# Patient Record
Sex: FEMALE | Race: WHITE | NOT HISPANIC OR LATINO
[De-identification: names, ages, dates, MRNs, and addresses within clinical notes are randomized per-mention and may not be internally consistent; named-entity substitution may affect disease eponyms.]

---

## 2017-09-20 PROBLEM — Z00.00 ENCOUNTER FOR PREVENTIVE HEALTH EXAMINATION: Status: ACTIVE | Noted: 2017-09-20

## 2017-09-22 ENCOUNTER — MESSAGE (OUTPATIENT)
Age: 82
End: 2017-09-22

## 2017-10-01 RX ORDER — ASPIRIN ENTERIC COATED TABLETS 81 MG 81 MG/1
81 TABLET, DELAYED RELEASE ORAL DAILY
Qty: 30 | Refills: 3 | Status: ACTIVE | COMMUNITY
Start: 2017-10-01

## 2017-10-02 ENCOUNTER — APPOINTMENT (OUTPATIENT)
Dept: FAMILY MEDICINE | Facility: CLINIC | Age: 82
End: 2017-10-02
Payer: MEDICARE

## 2017-10-02 VITALS
SYSTOLIC BLOOD PRESSURE: 140 MMHG | HEIGHT: 62 IN | HEART RATE: 70 BPM | BODY MASS INDEX: 25.76 KG/M2 | RESPIRATION RATE: 16 BRPM | WEIGHT: 140 LBS | OXYGEN SATURATION: 98 % | DIASTOLIC BLOOD PRESSURE: 60 MMHG | TEMPERATURE: 97.6 F

## 2017-10-02 DIAGNOSIS — Z23 ENCOUNTER FOR IMMUNIZATION: ICD-10-CM

## 2017-10-02 DIAGNOSIS — I10 ESSENTIAL (PRIMARY) HYPERTENSION: ICD-10-CM

## 2017-10-02 DIAGNOSIS — Z86.39 PERSONAL HISTORY OF OTHER ENDOCRINE, NUTRITIONAL AND METABOLIC DISEASE: ICD-10-CM

## 2017-10-02 DIAGNOSIS — Z63.4 DISAPPEARANCE AND DEATH OF FAMILY MEMBER: ICD-10-CM

## 2017-10-02 DIAGNOSIS — C44.92 SQUAMOUS CELL CARCINOMA OF SKIN, UNSPECIFIED: ICD-10-CM

## 2017-10-02 DIAGNOSIS — Z80.3 FAMILY HISTORY OF MALIGNANT NEOPLASM OF BREAST: ICD-10-CM

## 2017-10-02 DIAGNOSIS — Z87.2 PERSONAL HISTORY OF DISEASES OF THE SKIN AND SUBCUTANEOUS TISSUE: ICD-10-CM

## 2017-10-02 DIAGNOSIS — F15.90 OTHER STIMULANT USE, UNSPECIFIED, UNCOMPLICATED: ICD-10-CM

## 2017-10-02 DIAGNOSIS — M19.049 PRIMARY OSTEOARTHRITIS, UNSPECIFIED HAND: ICD-10-CM

## 2017-10-02 PROCEDURE — 90686 IIV4 VACC NO PRSV 0.5 ML IM: CPT

## 2017-10-02 PROCEDURE — 99204 OFFICE O/P NEW MOD 45 MIN: CPT | Mod: 25

## 2017-10-02 PROCEDURE — G0008: CPT

## 2017-10-02 RX ORDER — NYSTATIN 100000 1/G
100000 POWDER TOPICAL
Qty: 1 | Refills: 1 | Status: COMPLETED | COMMUNITY
Start: 2017-10-01 | End: 2017-10-02

## 2017-10-02 RX ORDER — ACETAMINOPHEN 325 MG/1
325 TABLET, FILM COATED ORAL DAILY
Qty: 30 | Refills: 0 | Status: ACTIVE | COMMUNITY
Start: 2017-10-02

## 2017-10-02 RX ORDER — ASCORBIC ACID 1000 MG
1000 TABLET ORAL DAILY
Qty: 30 | Refills: 0 | Status: ACTIVE | COMMUNITY
Start: 2017-10-02

## 2017-10-02 RX ORDER — DOCUSATE SODIUM 100 MG/1
100 CAPSULE ORAL
Qty: 20 | Refills: 0 | Status: ACTIVE | COMMUNITY
Start: 2017-10-02

## 2017-10-02 RX ORDER — WHEAT DEXTRIN 3 G/4 G
POWDER (GRAM) ORAL DAILY
Qty: 30 | Refills: 3 | Status: ACTIVE | COMMUNITY
Start: 2017-10-02

## 2017-10-02 SDOH — SOCIAL STABILITY - SOCIAL INSECURITY: DISSAPEARANCE AND DEATH OF FAMILY MEMBER: Z63.4

## 2018-01-05 ENCOUNTER — APPOINTMENT (OUTPATIENT)
Dept: FAMILY MEDICINE | Facility: CLINIC | Age: 83
End: 2018-01-05
Payer: MEDICARE

## 2018-01-05 VITALS
OXYGEN SATURATION: 98 % | TEMPERATURE: 97.6 F | RESPIRATION RATE: 16 BRPM | HEART RATE: 65 BPM | BODY MASS INDEX: 24.88 KG/M2 | SYSTOLIC BLOOD PRESSURE: 150 MMHG | WEIGHT: 136 LBS | DIASTOLIC BLOOD PRESSURE: 70 MMHG

## 2018-01-05 DIAGNOSIS — L98.9 DISORDER OF THE SKIN AND SUBCUTANEOUS TISSUE, UNSPECIFIED: ICD-10-CM

## 2018-01-05 DIAGNOSIS — G30.8 OTHER ALZHEIMER'S DISEASE: ICD-10-CM

## 2018-01-05 DIAGNOSIS — F02.80 OTHER ALZHEIMER'S DISEASE: ICD-10-CM

## 2018-01-05 PROCEDURE — 99214 OFFICE O/P EST MOD 30 MIN: CPT

## 2018-01-05 RX ORDER — QUETIAPINE FUMARATE 25 MG/1
25 TABLET ORAL
Qty: 30 | Refills: 0 | Status: ACTIVE | COMMUNITY
Start: 2018-01-05 | End: 1900-01-01

## 2018-01-06 PROBLEM — L98.9 SKIN LESION OF LEFT LEG: Status: ACTIVE | Noted: 2018-01-05

## 2018-01-29 ENCOUNTER — APPOINTMENT (OUTPATIENT)
Dept: FAMILY MEDICINE | Facility: CLINIC | Age: 83
End: 2018-01-29
Payer: MEDICARE

## 2018-01-29 VITALS
HEART RATE: 69 BPM | OXYGEN SATURATION: 93 % | DIASTOLIC BLOOD PRESSURE: 60 MMHG | SYSTOLIC BLOOD PRESSURE: 128 MMHG | RESPIRATION RATE: 15 BRPM | TEMPERATURE: 98 F

## 2018-01-29 PROCEDURE — 99214 OFFICE O/P EST MOD 30 MIN: CPT

## 2018-01-29 RX ORDER — SOFT LENS DISINFECTANT
SOLUTION, NON-ORAL MISCELLANEOUS
Qty: 1 | Refills: 0 | Status: ACTIVE | COMMUNITY
Start: 2018-01-29 | End: 1900-01-01

## 2018-01-29 RX ORDER — GUAIFENESIN 600 MG/1
600 TABLET, EXTENDED RELEASE ORAL
Qty: 20 | Refills: 0 | Status: COMPLETED | COMMUNITY
Start: 2018-01-29 | End: 2018-02-08

## 2018-04-06 ENCOUNTER — APPOINTMENT (OUTPATIENT)
Dept: FAMILY MEDICINE | Facility: CLINIC | Age: 83
End: 2018-04-06
Payer: MEDICARE

## 2018-04-06 VITALS
DIASTOLIC BLOOD PRESSURE: 70 MMHG | RESPIRATION RATE: 15 BRPM | TEMPERATURE: 97.8 F | HEIGHT: 62 IN | BODY MASS INDEX: 23.92 KG/M2 | HEART RATE: 73 BPM | SYSTOLIC BLOOD PRESSURE: 130 MMHG | WEIGHT: 130 LBS | OXYGEN SATURATION: 98 %

## 2018-04-06 DIAGNOSIS — E53.8 DEFICIENCY OF OTHER SPECIFIED B GROUP VITAMINS: ICD-10-CM

## 2018-04-06 DIAGNOSIS — J45.909 UNSPECIFIED ASTHMA, UNCOMPLICATED: ICD-10-CM

## 2018-04-06 DIAGNOSIS — M81.0 AGE-RELATED OSTEOPOROSIS W/OUT CURRENT PATHOLOGICAL FRACTURE: ICD-10-CM

## 2018-04-06 DIAGNOSIS — E55.9 VITAMIN D DEFICIENCY, UNSPECIFIED: ICD-10-CM

## 2018-04-06 PROCEDURE — 99213 OFFICE O/P EST LOW 20 MIN: CPT

## 2018-04-06 RX ORDER — ALBUTEROL SULFATE 2.5 MG/3ML
(2.5 MG/3ML) SOLUTION RESPIRATORY (INHALATION) EVERY 6 HOURS
Qty: 12 | Refills: 1 | Status: DISCONTINUED | COMMUNITY
Start: 2018-01-29 | End: 2018-04-06

## 2018-04-06 RX ORDER — NEBULIZER ACCESSORIES
KIT MISCELLANEOUS
Qty: 1 | Refills: 0 | Status: COMPLETED | COMMUNITY
Start: 2018-01-29 | End: 2018-04-06

## 2018-04-07 PROBLEM — E55.9 VITAMIN D DEFICIENCY: Status: ACTIVE | Noted: 2017-10-02

## 2018-04-07 PROBLEM — E53.8 VITAMIN B12 DEFICIENCY: Status: ACTIVE | Noted: 2017-10-01

## 2018-05-03 ENCOUNTER — MEDICATION RENEWAL (OUTPATIENT)
Age: 83
End: 2018-05-03

## 2018-05-03 DIAGNOSIS — B36.9 SUPERFICIAL MYCOSIS, UNSPECIFIED: ICD-10-CM

## 2018-05-03 RX ORDER — NYSTATIN 100000 [USP'U]/G
100000 CREAM TOPICAL
Qty: 1 | Refills: 1 | Status: ACTIVE | COMMUNITY
Start: 2018-05-03 | End: 1900-01-01

## 2018-05-17 ENCOUNTER — MESSAGE (OUTPATIENT)
Age: 83
End: 2018-05-17

## 2018-07-02 ENCOUNTER — MESSAGE (OUTPATIENT)
Age: 83
End: 2018-07-02

## 2018-07-02 PROBLEM — G30.9 DEMENTIA, ALZHEIMER'S, WITH BEHAVIOR DISTURBANCE: Status: ACTIVE | Noted: 2018-01-05

## 2018-07-02 PROBLEM — Z23 NEED FOR PNEUMOCOCCAL VACCINE: Status: ACTIVE | Noted: 2018-01-04

## 2018-07-02 PROBLEM — S81.812A NONINFECTED SKIN TEAR OF LEFT LEG: Status: RESOLVED | Noted: 2018-01-29 | Resolved: 2018-07-02

## 2018-07-02 PROBLEM — S41.111A SKIN TEAR OF RIGHT UPPER EXTREMITY: Status: RESOLVED | Noted: 2018-01-29 | Resolved: 2018-07-02

## 2018-07-02 PROBLEM — K59.00 CONSTIPATION, UNSPECIFIED CONSTIPATION TYPE: Status: ACTIVE | Noted: 2017-10-02

## 2018-07-03 ENCOUNTER — APPOINTMENT (OUTPATIENT)
Dept: FAMILY MEDICINE | Facility: CLINIC | Age: 83
End: 2018-07-03
Payer: MEDICARE

## 2018-07-03 VITALS
WEIGHT: 128 LBS | SYSTOLIC BLOOD PRESSURE: 140 MMHG | TEMPERATURE: 98 F | DIASTOLIC BLOOD PRESSURE: 60 MMHG | OXYGEN SATURATION: 99 % | RESPIRATION RATE: 15 BRPM | BODY MASS INDEX: 23.55 KG/M2 | HEIGHT: 62 IN | HEART RATE: 65 BPM

## 2018-07-03 DIAGNOSIS — G30.9 ALZHEIMER'S DISEASE, UNSPECIFIED: ICD-10-CM

## 2018-07-03 DIAGNOSIS — K59.00 CONSTIPATION, UNSPECIFIED: ICD-10-CM

## 2018-07-03 DIAGNOSIS — Z23 ENCOUNTER FOR IMMUNIZATION: ICD-10-CM

## 2018-07-03 DIAGNOSIS — S41.111A LACERATION W/OUT FOREIGN BODY OF RIGHT UPPER ARM, INITIAL ENCOUNTER: ICD-10-CM

## 2018-07-03 DIAGNOSIS — S81.812A LACERATION W/OUT FOREIGN BODY, LEFT LOWER LEG, INITIAL ENCOUNTER: ICD-10-CM

## 2018-07-03 DIAGNOSIS — F02.81 ALZHEIMER'S DISEASE, UNSPECIFIED: ICD-10-CM

## 2018-07-03 PROCEDURE — 99213 OFFICE O/P EST LOW 20 MIN: CPT

## 2018-07-03 NOTE — COUNSELING
[Healthy eating counseling provided] : healthy eating [Activity counseling provided] : activity [Disease Management counseling provided] : disease management  [Behavioral health counseling provided] : behavioral health  [Walking] : Walking [Low Salt Diet] : Low salt diet [Take medications as directed] : Take medications as directed [Avoid Social Isolation] : Avoid social isolation [Sleep ___ hours/day] : Sleep [unfilled] hours/day [Engage in a relaxing activity] : Engage in a relaxing activity

## 2018-07-04 NOTE — HISTORY OF PRESENT ILLNESS
[Does not check BP] : The patient is not checking blood pressure [Family Member] : family member [FreeTextEntry1] : dementia status is unchanged, no further episodes of agitation and family is aware to keep things same from day to day with minimal change in routine to assist in controlling any agitation.

## 2018-07-04 NOTE — HEALTH RISK ASSESSMENT
[(PHQ-2) Unable to screen] : PHQ-2: unable to screen [] : No [No falls in past year] : Patient reported no falls in the past year [de-identified] : none [UnableToScreenReason] : dementia [Some assistance needed] : feeding [Full assistance needed] : managing finances

## 2018-07-04 NOTE — REVIEW OF SYSTEMS
[Constipation] : constipation [Negative] : Musculoskeletal [Fever] : no fever [Earache] : no earache [Nasal Discharge] : no nasal discharge [Sore Throat] : no sore throat [Cough] : no cough [Dyspnea on Exertion] : no dyspnea on exertion [Abdominal Pain] : no abdominal pain [FreeTextEntry7] : constipation is stable with current regimen and diet, appetite is good according to daughter-in-law [de-identified] : dry skin

## 2018-07-04 NOTE — PHYSICAL EXAM
[No Acute Distress] : no acute distress [Well Nourished] : well nourished [Normal Sclera/Conjunctiva] : normal sclera/conjunctiva [PERRL] : pupils equal round and reactive to light [Normal Outer Ear/Nose] : the outer ears and nose were normal in appearance [No JVD] : no jugular venous distention [No Respiratory Distress] : no respiratory distress  [Clear to Auscultation] : lungs were clear to auscultation bilaterally [No Accessory Muscle Use] : no accessory muscle use [Normal Rate] : normal rate  [Regular Rhythm] : with a regular rhythm [Normal S1, S2] : normal S1 and S2 [No Murmur] : no murmur heard [No Edema] : there was no peripheral edema [Normal Supraclavicular Nodes] : no supraclavicular lymphadenopathy [Normal Posterior Cervical Nodes] : no posterior cervical lymphadenopathy [Normal Anterior Cervical Nodes] : no anterior cervical lymphadenopathy [No Focal Deficits] : no focal deficits [Normal Affect] : the affect was normal [de-identified] : several keratoses, no skin tears at this time, n wounds

## 2018-07-22 ENCOUNTER — RESULT REVIEW (OUTPATIENT)
Age: 83
End: 2018-07-22

## 2018-07-22 LAB
25(OH)D3 SERPL-MCNC: 54 NG/ML
ALBUMIN SERPL ELPH-MCNC: 3.8 G/DL
ANION GAP SERPL CALC-SCNC: 13 MMOL/L
BASOPHILS # BLD AUTO: 0.02 K/UL
BASOPHILS NFR BLD AUTO: 0.3 %
BUN SERPL-MCNC: 13 MG/DL
CALCIUM SERPL-MCNC: 9.7 MG/DL
CHLORIDE SERPL-SCNC: 101 MMOL/L
CO2 SERPL-SCNC: 26 MMOL/L
CREAT SERPL-MCNC: 1.11 MG/DL
EOSINOPHIL # BLD AUTO: 0.19 K/UL
EOSINOPHIL NFR BLD AUTO: 3 %
GLUCOSE SERPL-MCNC: 96 MG/DL
HCT VFR BLD CALC: 41.6 %
HGB BLD-MCNC: 14.2 G/DL
IMM GRANULOCYTES NFR BLD AUTO: 0.3 %
LYMPHOCYTES # BLD AUTO: 1.56 K/UL
LYMPHOCYTES NFR BLD AUTO: 24.8 %
MAN DIFF?: NORMAL
MCHC RBC-ENTMCNC: 33.2 PG
MCHC RBC-ENTMCNC: 34.1 GM/DL
MCV RBC AUTO: 97.2 FL
MONOCYTES # BLD AUTO: 0.53 K/UL
MONOCYTES NFR BLD AUTO: 8.4 %
NEUTROPHILS # BLD AUTO: 3.98 K/UL
NEUTROPHILS NFR BLD AUTO: 63.2 %
PHOSPHATE SERPL-MCNC: 3.6 MG/DL
PLATELET # BLD AUTO: 234 K/UL
POTASSIUM SERPL-SCNC: 4.1 MMOL/L
RBC # BLD: 4.28 M/UL
RBC # FLD: 13.1 %
SODIUM SERPL-SCNC: 140 MMOL/L
VIT B12 SERPL-MCNC: 1816 PG/ML
WBC # FLD AUTO: 6.3 K/UL

## 2018-09-22 ENCOUNTER — INPATIENT (INPATIENT)
Facility: HOSPITAL | Age: 83
LOS: 3 days | Discharge: OTHER INSTITUTION | DRG: 481 | End: 2018-09-26
Attending: FAMILY MEDICINE | Admitting: HOSPITALIST
Payer: MEDICARE

## 2018-09-22 ENCOUNTER — TRANSCRIPTION ENCOUNTER (OUTPATIENT)
Age: 83
End: 2018-09-22

## 2018-09-22 VITALS
SYSTOLIC BLOOD PRESSURE: 197 MMHG | RESPIRATION RATE: 18 BRPM | OXYGEN SATURATION: 98 % | DIASTOLIC BLOOD PRESSURE: 122 MMHG | HEART RATE: 61 BPM | WEIGHT: 134.92 LBS | HEIGHT: 62 IN

## 2018-09-22 DIAGNOSIS — Z98.891 HISTORY OF UTERINE SCAR FROM PREVIOUS SURGERY: Chronic | ICD-10-CM

## 2018-09-22 DIAGNOSIS — N18.3 CHRONIC KIDNEY DISEASE, STAGE 3 (MODERATE): ICD-10-CM

## 2018-09-22 DIAGNOSIS — S72.92XA UNSPECIFIED FRACTURE OF LEFT FEMUR, INITIAL ENCOUNTER FOR CLOSED FRACTURE: ICD-10-CM

## 2018-09-22 DIAGNOSIS — F03.90 UNSPECIFIED DEMENTIA WITHOUT BEHAVIORAL DISTURBANCE: ICD-10-CM

## 2018-09-22 DIAGNOSIS — E89.0 POSTPROCEDURAL HYPOTHYROIDISM: Chronic | ICD-10-CM
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
## 2018-09-22 DIAGNOSIS — S72.142A DISPLACED INTERTROCHANTERIC FRACTURE OF LEFT FEMUR, INITIAL ENCOUNTER FOR CLOSED FRACTURE: ICD-10-CM

## 2018-09-22 LAB
ALBUMIN SERPL ELPH-MCNC: 3.3 G/DL — SIGNIFICANT CHANGE UP (ref 3.3–5)
ALP SERPL-CCNC: 60 U/L — SIGNIFICANT CHANGE UP (ref 40–120)
ALT FLD-CCNC: 16 U/L DA — SIGNIFICANT CHANGE UP (ref 10–45)
ANION GAP SERPL CALC-SCNC: 11 MMOL/L — SIGNIFICANT CHANGE UP (ref 5–17)
APTT BLD: 29.6 SEC — SIGNIFICANT CHANGE UP (ref 27.5–37.4)
AST SERPL-CCNC: 25 U/L — SIGNIFICANT CHANGE UP (ref 10–40)
BILIRUB SERPL-MCNC: 0.4 MG/DL — SIGNIFICANT CHANGE UP (ref 0.2–1.2)
BLD GP AB SCN SERPL QL: SIGNIFICANT CHANGE UP
BUN SERPL-MCNC: 17 MG/DL — SIGNIFICANT CHANGE UP (ref 7–23)
CALCIUM SERPL-MCNC: 9.3 MG/DL — SIGNIFICANT CHANGE UP (ref 8.4–10.5)
CHLORIDE SERPL-SCNC: 104 MMOL/L — SIGNIFICANT CHANGE UP (ref 96–108)
CO2 SERPL-SCNC: 24 MMOL/L — SIGNIFICANT CHANGE UP (ref 22–31)
CREAT SERPL-MCNC: 1.04 MG/DL — SIGNIFICANT CHANGE UP (ref 0.5–1.3)
GLUCOSE SERPL-MCNC: 118 MG/DL — HIGH (ref 70–99)
HCT VFR BLD CALC: 39.4 % — SIGNIFICANT CHANGE UP (ref 34.5–45)
HGB BLD-MCNC: 13.2 G/DL — SIGNIFICANT CHANGE UP (ref 11.5–15.5)
INR BLD: 1.08 RATIO — SIGNIFICANT CHANGE UP (ref 0.88–1.16)
MCHC RBC-ENTMCNC: 33.5 GM/DL — SIGNIFICANT CHANGE UP (ref 32–36)
MCHC RBC-ENTMCNC: 33.5 PG — SIGNIFICANT CHANGE UP (ref 27–34)
MCV RBC AUTO: 100.2 FL — HIGH (ref 80–100)
PLATELET # BLD AUTO: 203 K/UL — SIGNIFICANT CHANGE UP (ref 150–400)
POTASSIUM SERPL-MCNC: 3.9 MMOL/L — SIGNIFICANT CHANGE UP (ref 3.5–5.3)
POTASSIUM SERPL-SCNC: 3.9 MMOL/L — SIGNIFICANT CHANGE UP (ref 3.5–5.3)
PROT SERPL-MCNC: 7 G/DL — SIGNIFICANT CHANGE UP (ref 6–8.3)
PROTHROM AB SERPL-ACNC: 12 SEC — SIGNIFICANT CHANGE UP (ref 9.8–12.7)
RBC # BLD: 3.94 M/UL — SIGNIFICANT CHANGE UP (ref 3.8–5.2)
RBC # FLD: 11.6 % — SIGNIFICANT CHANGE UP (ref 10.3–14.5)
SODIUM SERPL-SCNC: 139 MMOL/L — SIGNIFICANT CHANGE UP (ref 135–145)
WBC # BLD: 11 K/UL — HIGH (ref 3.8–10.5)
WBC # FLD AUTO: 11 K/UL — HIGH (ref 3.8–10.5)

## 2018-09-22 PROCEDURE — 99223 1ST HOSP IP/OBS HIGH 75: CPT

## 2018-09-22 PROCEDURE — 99285 EMERGENCY DEPT VISIT HI MDM: CPT

## 2018-09-22 PROCEDURE — 73501 X-RAY EXAM HIP UNI 1 VIEW: CPT | Mod: 26,LT

## 2018-09-22 PROCEDURE — 73551 X-RAY EXAM OF FEMUR 1: CPT | Mod: 26,LT

## 2018-09-22 PROCEDURE — 99223 1ST HOSP IP/OBS HIGH 75: CPT | Mod: 57

## 2018-09-22 RX ORDER — ACETAMINOPHEN 500 MG
650 TABLET ORAL EVERY 6 HOURS
Qty: 0 | Refills: 0 | Status: DISCONTINUED | OUTPATIENT
Start: 2018-09-22 | End: 2018-09-22

## 2018-09-22 RX ORDER — SENNA PLUS 8.6 MG/1
2 TABLET ORAL AT BEDTIME
Qty: 0 | Refills: 0 | Status: DISCONTINUED | OUTPATIENT
Start: 2018-09-22 | End: 2018-09-23

## 2018-09-22 RX ORDER — SODIUM CHLORIDE 9 MG/ML
1000 INJECTION, SOLUTION INTRAVENOUS
Qty: 0 | Refills: 0 | Status: DISCONTINUED | OUTPATIENT
Start: 2018-09-23 | End: 2018-09-23

## 2018-09-22 RX ORDER — PREGABALIN 225 MG/1
1000 CAPSULE ORAL DAILY
Qty: 0 | Refills: 0 | Status: DISCONTINUED | OUTPATIENT
Start: 2018-09-22 | End: 2018-09-23

## 2018-09-22 RX ORDER — SODIUM CHLORIDE 9 MG/ML
1000 INJECTION INTRAMUSCULAR; INTRAVENOUS; SUBCUTANEOUS ONCE
Qty: 0 | Refills: 0 | Status: COMPLETED | OUTPATIENT
Start: 2018-09-22 | End: 2018-09-22

## 2018-09-22 RX ORDER — HEPARIN SODIUM 5000 [USP'U]/ML
5000 INJECTION INTRAVENOUS; SUBCUTANEOUS EVERY 12 HOURS
Qty: 0 | Refills: 0 | Status: DISCONTINUED | OUTPATIENT
Start: 2018-09-22 | End: 2018-09-23

## 2018-09-22 RX ORDER — INFLUENZA VIRUS VACCINE 15; 15; 15; 15 UG/.5ML; UG/.5ML; UG/.5ML; UG/.5ML
0.5 SUSPENSION INTRAMUSCULAR ONCE
Qty: 0 | Refills: 0 | Status: COMPLETED | OUTPATIENT
Start: 2018-09-22 | End: 2018-09-26

## 2018-09-22 RX ORDER — ACETAMINOPHEN 500 MG
650 TABLET ORAL EVERY 6 HOURS
Qty: 0 | Refills: 0 | Status: DISCONTINUED | OUTPATIENT
Start: 2018-09-22 | End: 2018-09-23

## 2018-09-22 RX ORDER — MORPHINE SULFATE 50 MG/1
2 CAPSULE, EXTENDED RELEASE ORAL EVERY 4 HOURS
Qty: 0 | Refills: 0 | Status: DISCONTINUED | OUTPATIENT
Start: 2018-09-22 | End: 2018-09-23

## 2018-09-22 RX ORDER — CHOLECALCIFEROL (VITAMIN D3) 125 MCG
1000 CAPSULE ORAL DAILY
Qty: 0 | Refills: 0 | Status: DISCONTINUED | OUTPATIENT
Start: 2018-09-22 | End: 2018-09-23

## 2018-09-22 RX ORDER — MORPHINE SULFATE 50 MG/1
2 CAPSULE, EXTENDED RELEASE ORAL ONCE
Qty: 0 | Refills: 0 | Status: DISCONTINUED | OUTPATIENT
Start: 2018-09-22 | End: 2018-09-22

## 2018-09-22 RX ORDER — CHLORHEXIDINE GLUCONATE 213 G/1000ML
1 SOLUTION TOPICAL ONCE
Qty: 0 | Refills: 0 | Status: COMPLETED | OUTPATIENT
Start: 2018-09-22 | End: 2018-09-23

## 2018-09-22 RX ORDER — DOCUSATE SODIUM 100 MG
100 CAPSULE ORAL THREE TIMES A DAY
Qty: 0 | Refills: 0 | Status: DISCONTINUED | OUTPATIENT
Start: 2018-09-22 | End: 2018-09-23

## 2018-09-22 RX ADMIN — MORPHINE SULFATE 2 MILLIGRAM(S): 50 CAPSULE, EXTENDED RELEASE ORAL at 15:13

## 2018-09-22 RX ADMIN — Medication 100 MILLIGRAM(S): at 23:14

## 2018-09-22 RX ADMIN — MORPHINE SULFATE 2 MILLIGRAM(S): 50 CAPSULE, EXTENDED RELEASE ORAL at 14:30

## 2018-09-22 RX ADMIN — SODIUM CHLORIDE 1000 MILLILITER(S): 9 INJECTION INTRAMUSCULAR; INTRAVENOUS; SUBCUTANEOUS at 15:10

## 2018-09-22 RX ADMIN — HEPARIN SODIUM 5000 UNIT(S): 5000 INJECTION INTRAVENOUS; SUBCUTANEOUS at 18:10

## 2018-09-22 RX ADMIN — SODIUM CHLORIDE 1000 MILLILITER(S): 9 INJECTION INTRAMUSCULAR; INTRAVENOUS; SUBCUTANEOUS at 13:31

## 2018-09-22 NOTE — H&P ADULT - PROBLEM SELECTOR PLAN 1
ortho consult/pain control/stool softeners  HSQ for DVT PPx     Patient is RCRI score 1 = .9% risk of major cardiac event perioperatively.  She is undergoing a low risk surgery.  Patient medically optimized for surgery.      Explained risk/benefits and given patient has good functional status prior to fall, patient and family in agreement with proceeding with surgery.

## 2018-09-22 NOTE — ED PROVIDER NOTE - PRINCIPAL DIAGNOSIS
Closed fracture of left femur, unspecified fracture morphology, unspecified portion of femur, initial encounter

## 2018-09-22 NOTE — ED PROVIDER NOTE - PHYSICAL EXAMINATION
AAOx3  Heart: s1/s2, RRR  Lung: CTA b/l  Abd: soft, NT/ND, no rebound or guarding, NCVAT  skin: + small abrasion on left hand, FROM of all fingers, no TTP   Extremity: left leg is externally rotated, limited ROM secondary to pain  patient able to move her toes  pelvis stable  2+ pedal pulses b/l   no neurovascular compromised noted on exam

## 2018-09-22 NOTE — H&P ADULT - NSHPREVIEWOFSYSTEMS_GEN_ALL_CORE
REVIEW OF SYSTEMS:  CONSTITUTIONAL: No fever, weight loss, or fatigue  EYES: No eye pain, visual disturbances, or discharge  ENMT:  No difficulty hearing, tinnitus, vertigo; No sinus or throat pain  NECK: No pain or stiffness  BREASTS: No pain, masses, or nipple discharge  RESPIRATORY: No cough, wheezing, chills or hemoptysis; No shortness of breath  CARDIOVASCULAR: No chest pain, palpitations, dizziness, or leg swelling  GASTROINTESTINAL: No abdominal or epigastric pain. No nausea, vomiting, or hematemesis; No diarrhea or constipation. No melena or hematochezia.  GENITOURINARY: No dysuria, frequency, hematuria, or incontinence  NEUROLOGICAL: No headaches, memory loss, loss of strength, numbness, or tremors  SKIN: No itching, burning, rashes, or lesions   LYMPH NODES: No enlarged glands  ENDOCRINE: No heat or cold intolerance; No hair loss  MUSCULOSKELETAL: No joint pain or swelling; No muscle, back, pain when moving right LE   PSYCHIATRIC: No depression, anxiety, mood swings, or difficulty sleeping  HEME/LYMPH: No easy bruising, or bleeding gums  ALLERY AND IMMUNOLOGIC: No hives or eczema    ALL ROS REVIEWED AND NORMAL EXCEPT AS STATED ABOVE

## 2018-09-22 NOTE — ED ADULT NURSE NOTE - NS PRO AD PATIENT TYPE ON CHART
Medical Orders for Life-Sustaining Treatment (MOLST) Medical Orders for Life-Sustaining Treatment (MOLST)/Health Care Proxy (HCP)

## 2018-09-22 NOTE — ED ADULT NURSE NOTE - NSIMPLEMENTINTERV_GEN_ALL_ED
Implemented All Fall Risk Interventions:  [place] to call system. Call bell, personal items and telephone within reach. Instruct patient to call for assistance. Room bathroom lighting operational. Non-slip footwear when patient is off stretcher. Physically safe environment: no spills, clutter or unnecessary equipment. Stretcher in lowest position, wheels locked, appropriate side rails in place. Provide visual cue, wrist band, yellow gown, etc. Monitor gait and stability. Monitor for mental status changes and reorient to person, place, and time. Review medications for side effects contributing to fall risk. Reinforce activity limits and safety measures with patient and family.

## 2018-09-22 NOTE — H&P ADULT - HISTORY OF PRESENT ILLNESS
89W PMH stroke?, mild dementia, osteoporosis, HTN, COPD presents for fall home. Patient live with son and daughter in law and she fell at home.  Denies hitting head, son witnessed fall and she tried to grab onto him when falling.  She hit her right hip area and had severe right hip pain.  Son also noticed hip was significant displaced.      Patient states pain with movement but otherwise feels okay lying in bed.  Denies headache, fever, chills, nausea, vomiting, diarrhea.  Denies LOC, denies dizziness before or during incident.

## 2018-09-22 NOTE — ED PROVIDER NOTE - CARE PLAN
Principal Discharge DX:	Closed fracture of left femur, unspecified fracture morphology, unspecified portion of femur, initial encounter  Secondary Diagnosis:	Fall, initial encounter

## 2018-09-22 NOTE — ED PROVIDER NOTE - OBJECTIVE STATEMENT
s/p mechanical fall today left hip is externally rotated 89 y/o F with PMH of constipation presents to the ED for evaluation of left hip pain s/p mechanical fall today, witnessed by her son Vamsi. Patient's left hip is externally rotated, reports she is unable to move her left hip secondary to pain. She denies head trauma, LOC, paresthesias, abd pain, n/v, f/c, CP, SOB or symptoms prior to her fall. Her son confirmed no LOC or head trauma

## 2018-09-22 NOTE — H&P ADULT - NSHPPHYSICALEXAM_GEN_ALL_CORE
T(C): 37.4 (09-22-18 @ 15:18), Max: 37.4 (09-22-18 @ 15:18)  HR: 66 (09-22-18 @ 15:18) (60 - 66)  BP: 138/61 (09-22-18 @ 15:18) (138/61 - 197/122)  RR: 17 (09-22-18 @ 15:18) (17 - 18)  SpO2: 99% (09-22-18 @ 15:18) (98% - 100%)  Wt(kg): --Vital Signs Last 24 Hrs  T(C): 37.4 (22 Sep 2018 15:18), Max: 37.4 (22 Sep 2018 15:18)  T(F): 99.4 (22 Sep 2018 15:18), Max: 99.4 (22 Sep 2018 15:18)  HR: 66 (22 Sep 2018 15:18) (60 - 66)  BP: 138/61 (22 Sep 2018 15:18) (138/61 - 197/122)  BP(mean): --  RR: 17 (22 Sep 2018 15:18) (17 - 18)  SpO2: 99% (22 Sep 2018 15:18) (98% - 100%)    PHYSICAL EXAM:  GENERAL: NAD, well-groomed, well-developed  HEAD:  Atraumatic, Normocephalic  EYES: EOMI, PERRLA, conjunctiva and sclera clear  ENMT: No tonsillar erythema, exudates, or enlargement; Moist mucous membranes, Good dentition, No lesions  NECK: Supple, No JVD, Normal thyroid  NERVOUS SYSTEM:  Alert & Oriented X3, Good concentration; Motor Strength 5/5 B/L upper and lower extremities; DTRs 2+ intact and symmetric  CHEST/LUNG: Clear to percussion bilaterally; No rales, rhonchi, wheezing, or rubs  HEART: Regular rate and rhythm; No murmurs, rubs, or gallops  ABDOMEN: Soft, Nontender, Nondistended; Bowel sounds present  EXTREMITIES:  2+ Peripheral Pulses, No clubbing, no cyanosis, left leg shorter and abducted   LYMPH: No lymphadenopathy noted  SKIN: No rashes or lesions

## 2018-09-22 NOTE — H&P ADULT - ASSESSMENT
89W no sig PMH presents for fall found to have left intertrochanteric hip fracture.    To OR tomorrow with Dr. Dunn    IMPROVE VTE Individual Risk Assessment    RISK                                                                Points    [  ] Previous VTE                                                  3    [  ] Thrombophilia                                               2    [  ] Lower limb paralysis                                      2        (unable to hold up >15 seconds)      [  ] Current Cancer                                              2         (within 6 months)    [ X ] Immobilization > 24 hrs                                1    [  ] ICU/CCU stay > 24 hours                              1    [X  ] Age > 60                                                      1    IMPROVE VTE Score ___2______

## 2018-09-22 NOTE — H&P ADULT - NSHPLABSRESULTS_GEN_ALL_CORE
LABS:                        13.2   11.0  )-----------( 203      ( 22 Sep 2018 13:25 )             39.4     09-22    139  |  104  |  17  ----------------------------<  118<H>  3.9   |  24  |  1.04    Ca    9.3      22 Sep 2018 13:25    TPro  7.0  /  Alb  3.3  /  TBili  0.4  /  DBili  x   /  AST  25  /  ALT  16  /  AlkPhos  60  09-22    PT/INR - ( 22 Sep 2018 13:25 )   PT: 12.0 sec;   INR: 1.08 ratio         PTT - ( 22 Sep 2018 13:25 )  PTT:29.6 sec     CAPILLARY BLOOD GLUCOSE                RADIOLOGY & ADDITIONAL TESTS:    Consultant(s) Notes Reviewed:  [x ] YES  [ ] NO  Care Discussed with Consultants/Other Providers [ x] YES  [ ] NO  Imaging Personally Reviewed:  [ ] YES  [ ] NO

## 2018-09-22 NOTE — CONSULT NOTE ADULT - PROBLEM SELECTOR RECOMMENDATION 9
Plan for ORIF in AM  Risks and benefits of procedure discussed with patient and her son.  NPO after midnight

## 2018-09-22 NOTE — CONSULT NOTE ADULT - SUBJECTIVE AND OBJECTIVE BOX
LORI MILLER      89y Female with history of questionable stroke, mild dementia, osteoporosis, HTN, COPD presents s/p fall home. Patient lives with son and daughter in law and she fell at home.  Denies hitting head, son witnessed fall and she tried to grab onto him when falling.  She hit her left hip area and has severe left hip pain                                                                                                                                Past Medical History:  Arthralgia    Fall    Hypothyroid    Mild dementia.     Past Surgical History:  History of     History of thyroidectomy.                                             T(F): 98.6  HR: 65  BP: 125/87  RR: 15  SpO2: 99%                        13.2   11.0  )-----------( 203      ( 22 Sep 2018 13:25 )             39.4                     -    139  |  104  |  17  ----------------------------<  118<H>  3.9   |  24  |  1.04    Ca    9.3      22 Sep 2018 13:25    TPro  7.0  /  Alb  3.3  /  TBili  0.4  /  DBili  x   /  AST  25  /  ALT  16  /  AlkPhos  60  -      Physical Exam :    -   Left hip shortened and externally rotated, severe pain with ROM  -   Skin C/D/I.   -   Distal Neurvascular status intact grossly.   -   Warm well perfused; capillary refill <3 seconds   -   (+)EHL/FHL 5/5  -   (+) Sensation to light touch  -   (-) Calf tenderness Bilaterally    X-ray left hip shows reverse obliquity intertrochanteric femur fracture

## 2018-09-22 NOTE — ED PROVIDER NOTE - MEDICAL DECISION MAKING DETAILS
89 y/o with left hip pain s/p mechanical fall, on exam patient left leg is externally rotated   Plan: imaging results reviewed-- intertrochanteric left femur fracture noted, labs reviewed, spoke to orthopedic attending Dr. Dunn, whom is evaluating patient. Patient will be admitted to medicine

## 2018-09-22 NOTE — ED PROVIDER NOTE - ATTENDING CONTRIBUTION TO CARE
89 y f s/p fall pain L hip  PE 89 y f looks well mild distress due to pain   L hip tenderness, externally rotated L foot , neuro vs L LL intact  xray L proxima femur fx, pain control , ortho consult, pre op labs admitted to medicine for surgical procedure
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
## 2018-09-23 DIAGNOSIS — D50.0 IRON DEFICIENCY ANEMIA SECONDARY TO BLOOD LOSS (CHRONIC): ICD-10-CM

## 2018-09-23 LAB
ANION GAP SERPL CALC-SCNC: 10 MMOL/L — SIGNIFICANT CHANGE UP (ref 5–17)
ANION GAP SERPL CALC-SCNC: 12 MMOL/L — SIGNIFICANT CHANGE UP (ref 5–17)
BASOPHILS # BLD AUTO: 0 K/UL — SIGNIFICANT CHANGE UP (ref 0–0.2)
BASOPHILS NFR BLD AUTO: 0.4 % — SIGNIFICANT CHANGE UP (ref 0–2)
BUN SERPL-MCNC: 19 MG/DL — SIGNIFICANT CHANGE UP (ref 7–23)
BUN SERPL-MCNC: 21 MG/DL — SIGNIFICANT CHANGE UP (ref 7–23)
CALCIUM SERPL-MCNC: 8.5 MG/DL — SIGNIFICANT CHANGE UP (ref 8.4–10.5)
CALCIUM SERPL-MCNC: 9.2 MG/DL — SIGNIFICANT CHANGE UP (ref 8.4–10.5)
CHLORIDE SERPL-SCNC: 105 MMOL/L — SIGNIFICANT CHANGE UP (ref 96–108)
CHLORIDE SERPL-SCNC: 106 MMOL/L — SIGNIFICANT CHANGE UP (ref 96–108)
CO2 SERPL-SCNC: 22 MMOL/L — SIGNIFICANT CHANGE UP (ref 22–31)
CO2 SERPL-SCNC: 24 MMOL/L — SIGNIFICANT CHANGE UP (ref 22–31)
CREAT SERPL-MCNC: 1.09 MG/DL — SIGNIFICANT CHANGE UP (ref 0.5–1.3)
CREAT SERPL-MCNC: 1.2 MG/DL — SIGNIFICANT CHANGE UP (ref 0.5–1.3)
EOSINOPHIL # BLD AUTO: 0 K/UL — SIGNIFICANT CHANGE UP (ref 0–0.5)
EOSINOPHIL NFR BLD AUTO: 0.1 % — SIGNIFICANT CHANGE UP (ref 0–6)
GLUCOSE SERPL-MCNC: 157 MG/DL — HIGH (ref 70–99)
GLUCOSE SERPL-MCNC: 157 MG/DL — HIGH (ref 70–99)
HCT VFR BLD CALC: 25.6 % — LOW (ref 34.5–45)
HCT VFR BLD CALC: 32.9 % — LOW (ref 34.5–45)
HGB BLD-MCNC: 10.7 G/DL — LOW (ref 11.5–15.5)
HGB BLD-MCNC: 8.2 G/DL — LOW (ref 11.5–15.5)
LYMPHOCYTES # BLD AUTO: 1.6 K/UL — SIGNIFICANT CHANGE UP (ref 1–3.3)
LYMPHOCYTES # BLD AUTO: 16.1 % — SIGNIFICANT CHANGE UP (ref 13–44)
MCHC RBC-ENTMCNC: 32.5 PG — SIGNIFICANT CHANGE UP (ref 27–34)
MCHC RBC-ENTMCNC: 32.6 GM/DL — SIGNIFICANT CHANGE UP (ref 32–36)
MCV RBC AUTO: 99.7 FL — SIGNIFICANT CHANGE UP (ref 80–100)
MONOCYTES # BLD AUTO: 0.9 K/UL — SIGNIFICANT CHANGE UP (ref 0–0.9)
MONOCYTES NFR BLD AUTO: 9.2 % — HIGH (ref 1–9)
NEUTROPHILS # BLD AUTO: 7.5 K/UL — HIGH (ref 1.8–7.4)
NEUTROPHILS NFR BLD AUTO: 74.2 % — SIGNIFICANT CHANGE UP (ref 43–77)
PLATELET # BLD AUTO: 201 K/UL — SIGNIFICANT CHANGE UP (ref 150–400)
POTASSIUM SERPL-MCNC: 4.2 MMOL/L — SIGNIFICANT CHANGE UP (ref 3.5–5.3)
POTASSIUM SERPL-MCNC: 4.4 MMOL/L — SIGNIFICANT CHANGE UP (ref 3.5–5.3)
POTASSIUM SERPL-SCNC: 4.2 MMOL/L — SIGNIFICANT CHANGE UP (ref 3.5–5.3)
POTASSIUM SERPL-SCNC: 4.4 MMOL/L — SIGNIFICANT CHANGE UP (ref 3.5–5.3)
RBC # BLD: 3.3 M/UL — LOW (ref 3.8–5.2)
RBC # FLD: 11.6 % — SIGNIFICANT CHANGE UP (ref 10.3–14.5)
SODIUM SERPL-SCNC: 139 MMOL/L — SIGNIFICANT CHANGE UP (ref 135–145)
SODIUM SERPL-SCNC: 140 MMOL/L — SIGNIFICANT CHANGE UP (ref 135–145)
WBC # BLD: 10.1 K/UL — SIGNIFICANT CHANGE UP (ref 3.8–10.5)
WBC # FLD AUTO: 10.1 K/UL — SIGNIFICANT CHANGE UP (ref 3.8–10.5)

## 2018-09-23 PROCEDURE — 99232 SBSQ HOSP IP/OBS MODERATE 35: CPT

## 2018-09-23 PROCEDURE — 27245 TREAT THIGH FRACTURE: CPT | Mod: LT

## 2018-09-23 RX ORDER — SODIUM CHLORIDE 9 MG/ML
1000 INJECTION, SOLUTION INTRAVENOUS
Qty: 0 | Refills: 0 | Status: DISCONTINUED | OUTPATIENT
Start: 2018-09-23 | End: 2018-09-25

## 2018-09-23 RX ORDER — ACETAMINOPHEN 500 MG
650 TABLET ORAL EVERY 6 HOURS
Qty: 0 | Refills: 0 | Status: DISCONTINUED | OUTPATIENT
Start: 2018-09-23 | End: 2018-09-26

## 2018-09-23 RX ORDER — OXYCODONE AND ACETAMINOPHEN 5; 325 MG/1; MG/1
1 TABLET ORAL EVERY 4 HOURS
Qty: 0 | Refills: 0 | Status: DISCONTINUED | OUTPATIENT
Start: 2018-09-23 | End: 2018-09-26

## 2018-09-23 RX ORDER — HYDROMORPHONE HYDROCHLORIDE 2 MG/ML
0.5 INJECTION INTRAMUSCULAR; INTRAVENOUS; SUBCUTANEOUS
Qty: 0 | Refills: 0 | Status: DISCONTINUED | OUTPATIENT
Start: 2018-09-23 | End: 2018-09-26

## 2018-09-23 RX ORDER — SODIUM CHLORIDE 9 MG/ML
1000 INJECTION, SOLUTION INTRAVENOUS
Qty: 0 | Refills: 0 | Status: DISCONTINUED | OUTPATIENT
Start: 2018-09-23 | End: 2018-09-23

## 2018-09-23 RX ORDER — CHOLECALCIFEROL (VITAMIN D3) 125 MCG
1000 CAPSULE ORAL DAILY
Qty: 0 | Refills: 0 | Status: DISCONTINUED | OUTPATIENT
Start: 2018-09-23 | End: 2018-09-26

## 2018-09-23 RX ORDER — PREGABALIN 225 MG/1
1000 CAPSULE ORAL DAILY
Qty: 0 | Refills: 0 | Status: DISCONTINUED | OUTPATIENT
Start: 2018-09-23 | End: 2018-09-26

## 2018-09-23 RX ORDER — HYDROMORPHONE HYDROCHLORIDE 2 MG/ML
0.5 INJECTION INTRAMUSCULAR; INTRAVENOUS; SUBCUTANEOUS
Qty: 0 | Refills: 0 | Status: DISCONTINUED | OUTPATIENT
Start: 2018-09-23 | End: 2018-09-23

## 2018-09-23 RX ORDER — ENOXAPARIN SODIUM 100 MG/ML
30 INJECTION SUBCUTANEOUS DAILY
Qty: 0 | Refills: 0 | Status: DISCONTINUED | OUTPATIENT
Start: 2018-09-24 | End: 2018-09-26

## 2018-09-23 RX ORDER — CEFAZOLIN SODIUM 1 G
2000 VIAL (EA) INJECTION EVERY 8 HOURS
Qty: 0 | Refills: 0 | Status: COMPLETED | OUTPATIENT
Start: 2018-09-23 | End: 2018-09-24

## 2018-09-23 RX ADMIN — MORPHINE SULFATE 2 MILLIGRAM(S): 50 CAPSULE, EXTENDED RELEASE ORAL at 05:53

## 2018-09-23 RX ADMIN — SODIUM CHLORIDE 80 MILLILITER(S): 9 INJECTION, SOLUTION INTRAVENOUS at 16:39

## 2018-09-23 RX ADMIN — Medication 1000 UNIT(S): at 16:16

## 2018-09-23 RX ADMIN — Medication 100 MILLIGRAM(S): at 17:10

## 2018-09-23 RX ADMIN — PREGABALIN 1000 MICROGRAM(S): 225 CAPSULE ORAL at 16:16

## 2018-09-23 RX ADMIN — Medication 650 MILLIGRAM(S): at 16:51

## 2018-09-23 RX ADMIN — Medication 650 MILLIGRAM(S): at 17:51

## 2018-09-23 RX ADMIN — Medication 100 MILLIGRAM(S): at 05:38

## 2018-09-23 RX ADMIN — MORPHINE SULFATE 2 MILLIGRAM(S): 50 CAPSULE, EXTENDED RELEASE ORAL at 05:38

## 2018-09-23 RX ADMIN — CHLORHEXIDINE GLUCONATE 1 APPLICATION(S): 213 SOLUTION TOPICAL at 05:37

## 2018-09-23 NOTE — PROGRESS NOTE ADULT - SUBJECTIVE AND OBJECTIVE BOX
CHIEF COMPLAINT: fall with hip fracture    SUBJECTIVE: Patient is 89 yo with dementia, osteoporosis, B12 deficiency and vitamin D deficiency  who had a witnessed fall at home yesterday and was unable to stand after the fall related to her right hip which was "displaced" and painful. This am she does not recall what happened yesterday. She denies any active pain currently.     REVIEW OF SYSTEMS:    CONSTITUTIONAL: No fevers or chills  NECK: No pain   RESPIRATORY: No cough, No shortness of breath  CARDIOVASCULAR: No chest pain or palpitations  GASTROINTESTINAL: No abdominal pain, nausea, vomiting,   unable to obtain additional ROS due to dedmentia      Vital Signs Last 24 Hrs  T(C): 37.1 (23 Sep 2018 04:53), Max: 37.4 (22 Sep 2018 15:18)  T(F): 98.7 (23 Sep 2018 04:53), Max: 99.4 (22 Sep 2018 15:18)  HR: 80 (23 Sep 2018 04:53) (60 - 80)  BP: 106/70 (23 Sep 2018 04:53) (106/70 - 197/122)  BP(mean): --  RR: 15 (23 Sep 2018 04:53) (15 - 18)  SpO2: 97% (23 Sep 2018 04:53) (97% - 100%)    I&O's Summary    22 Sep 2018 07:01  -  23 Sep 2018 07:00  --------------------------------------------------------  IN: 240 mL / OUT: 0 mL / NET: 240 mL    PHYSICAL EXAM:    Constitutional: NAD, awake and alert, well-developed  HEENT: dry oral mucosa  Neck: Soft and suppleNo JVD  Respiratory: Breath sounds are clear bilaterally, No wheezing, rales or rhonchi  Cardiovascular: S1 and S2, regular rate and rhythm, no Murmurs, gallops or rubs  Gastrointestinal: Bowel Sounds present, soft, nontender  Extremities: No peripheral edema, left leg is turned outward not moving that leg  Vascular: 2+ peripheral pulses  Neurological: A/O x 3, no focal deficits  Musculoskeletal: 5/5 strength b/l upper and lower extremities  Skin: No rashes    MEDICATIONS:  MEDICATIONS  (STANDING):  cholecalciferol 1000 Unit(s) Oral daily  cyanocobalamin 1000 MICROGram(s) Oral daily  docusate sodium 100 milliGRAM(s) Oral three times a day  influenza   Vaccine 0.5 milliLiter(s) IntraMuscular once  lactated ringers. 1000 milliLiter(s) (70 mL/Hr) IV Continuous <Continuous>      LABS: All Labs Reviewed:                        10.7   10.1  )-----------( 201      ( 23 Sep 2018 05:55 )             32.9     09-23    139  |  105  |  19  ----------------------------<  157<H>  4.4   |  22  |  1.20    Ca    9.2      23 Sep 2018 05:55    TPro  7.0  /  Alb  3.3  /  TBili  0.4  /  DBili  x   /  AST  25  /  ALT  16  /  AlkPhos  60  09-22    PT/INR - ( 22 Sep 2018 13:25 )   PT: 12.0 sec;   INR: 1.08 ratio         PTT - ( 22 Sep 2018 13:25 )  PTT:29.6 sec    RADIOLOGY/EKG:normal on admission    DVT PPX: heparin- held for the OR this am  patient to be a DNR/DNI following the OR       DISPOSITION: to OR for DAPHNE Molina

## 2018-09-24 ENCOUNTER — MOBILE ON CALL (OUTPATIENT)
Age: 83
End: 2018-09-24

## 2018-09-24 LAB
ABO RH CONFIRMATION: SIGNIFICANT CHANGE UP
ANION GAP SERPL CALC-SCNC: 9 MMOL/L — SIGNIFICANT CHANGE UP (ref 5–17)
BUN SERPL-MCNC: 21 MG/DL — SIGNIFICANT CHANGE UP (ref 7–23)
CALCIUM SERPL-MCNC: 8.4 MG/DL — SIGNIFICANT CHANGE UP (ref 8.4–10.5)
CHLORIDE SERPL-SCNC: 105 MMOL/L — SIGNIFICANT CHANGE UP (ref 96–108)
CO2 SERPL-SCNC: 24 MMOL/L — SIGNIFICANT CHANGE UP (ref 22–31)
CREAT SERPL-MCNC: 1.1 MG/DL — SIGNIFICANT CHANGE UP (ref 0.5–1.3)
GLUCOSE SERPL-MCNC: 147 MG/DL — HIGH (ref 70–99)
HCT VFR BLD CALC: 20.8 % — CRITICAL LOW (ref 34.5–45)
HGB BLD-MCNC: 6.9 G/DL — CRITICAL LOW (ref 11.5–15.5)
MCHC RBC-ENTMCNC: 32.9 PG — SIGNIFICANT CHANGE UP (ref 27–34)
MCHC RBC-ENTMCNC: 33.1 GM/DL — SIGNIFICANT CHANGE UP (ref 32–36)
MCV RBC AUTO: 99.4 FL — SIGNIFICANT CHANGE UP (ref 80–100)
PLATELET # BLD AUTO: 163 K/UL — SIGNIFICANT CHANGE UP (ref 150–400)
POTASSIUM SERPL-MCNC: 4 MMOL/L — SIGNIFICANT CHANGE UP (ref 3.5–5.3)
POTASSIUM SERPL-SCNC: 4 MMOL/L — SIGNIFICANT CHANGE UP (ref 3.5–5.3)
RBC # BLD: 2.09 M/UL — LOW (ref 3.8–5.2)
RBC # FLD: 11.9 % — SIGNIFICANT CHANGE UP (ref 10.3–14.5)
SODIUM SERPL-SCNC: 138 MMOL/L — SIGNIFICANT CHANGE UP (ref 135–145)
WBC # BLD: 13.4 K/UL — HIGH (ref 3.8–10.5)
WBC # FLD AUTO: 13.4 K/UL — HIGH (ref 3.8–10.5)

## 2018-09-24 PROCEDURE — 99232 SBSQ HOSP IP/OBS MODERATE 35: CPT

## 2018-09-24 RX ORDER — ACETAMINOPHEN 500 MG
650 TABLET ORAL ONCE
Qty: 0 | Refills: 0 | Status: COMPLETED | OUTPATIENT
Start: 2018-09-24 | End: 2018-09-24

## 2018-09-24 RX ORDER — DIPHENHYDRAMINE HCL 50 MG
25 CAPSULE ORAL ONCE
Qty: 0 | Refills: 0 | Status: COMPLETED | OUTPATIENT
Start: 2018-09-24 | End: 2018-09-24

## 2018-09-24 RX ORDER — DOCUSATE SODIUM 100 MG
100 CAPSULE ORAL THREE TIMES A DAY
Qty: 0 | Refills: 0 | Status: DISCONTINUED | OUTPATIENT
Start: 2018-09-24 | End: 2018-09-26

## 2018-09-24 RX ORDER — SENNA PLUS 8.6 MG/1
2 TABLET ORAL AT BEDTIME
Qty: 0 | Refills: 0 | Status: DISCONTINUED | OUTPATIENT
Start: 2018-09-24 | End: 2018-09-26

## 2018-09-24 RX ORDER — ENOXAPARIN SODIUM 100 MG/ML
30 INJECTION SUBCUTANEOUS
Qty: 0 | Refills: 0 | DISCHARGE
Start: 2018-09-24 | End: 2018-10-07

## 2018-09-24 RX ADMIN — Medication 100 MILLIGRAM(S): at 21:11

## 2018-09-24 RX ADMIN — ENOXAPARIN SODIUM 30 MILLIGRAM(S): 100 INJECTION SUBCUTANEOUS at 11:41

## 2018-09-24 RX ADMIN — Medication 100 MILLIGRAM(S): at 16:12

## 2018-09-24 RX ADMIN — Medication 650 MILLIGRAM(S): at 16:12

## 2018-09-24 RX ADMIN — Medication 650 MILLIGRAM(S): at 16:09

## 2018-09-24 RX ADMIN — Medication 100 MILLIGRAM(S): at 01:28

## 2018-09-24 RX ADMIN — SODIUM CHLORIDE 80 MILLILITER(S): 9 INJECTION, SOLUTION INTRAVENOUS at 01:28

## 2018-09-24 RX ADMIN — SENNA PLUS 2 TABLET(S): 8.6 TABLET ORAL at 21:11

## 2018-09-24 RX ADMIN — Medication 25 MILLIGRAM(S): at 16:09

## 2018-09-24 RX ADMIN — PREGABALIN 1000 MICROGRAM(S): 225 CAPSULE ORAL at 11:41

## 2018-09-24 RX ADMIN — SODIUM CHLORIDE 80 MILLILITER(S): 9 INJECTION, SOLUTION INTRAVENOUS at 23:50

## 2018-09-24 RX ADMIN — Medication 1000 UNIT(S): at 11:41

## 2018-09-24 NOTE — PROGRESS NOTE ADULT - SUBJECTIVE AND OBJECTIVE BOX
CHIEF COMPLAINT: fracture femur- left s/p orif 9/23/18    SUBJECTIVE: Patient is 89 yo with ORIF yesterday of left femoral fracture by Dr. Molina. mild pain currently describing no pain lying in bed still. No PT evaluation yet. Patient has not moved her bowels since admission per nursing. Denies any SOB, chest pain or palpitations at this time. Appetite fine.     REVIEW OF SYSTEMS:    CONSTITUTIONAL: No weakness, fevers or chills   NECK: No pain   RESPIRATORY: No cough, wheezing,  No shortness of breath  CARDIOVASCULAR: No chest pain or palpitations  GASTROINTESTINAL: No abdominal  pain. No nausea, vomiting, + constipation since admission.  GENITOURINARY: No dysuria, frequency or hematuria  NEUROLOGICAL: No numbness or weakness, oriented X1 only- to person, not to place or time at this point  SKIN: No itching, burning, rashes, or lesions incision left thigh  All other review of systems is negative unless indicated above    Vital Signs Last 24 Hrs  T(C): 36.8 (24 Sep 2018 08:39), Max: 36.9 (23 Sep 2018 16:03)  T(F): 98.3 (24 Sep 2018 08:39), Max: 98.5 (24 Sep 2018 05:03)  HR: 116 (24 Sep 2018 08:39) (62 - 116)  BP: 127/71 (24 Sep 2018 08:39) (90/62 - 127/71)  BP(mean): --  RR: 15 (24 Sep 2018 08:39) (15 - 18)  SpO2: 100% (24 Sep 2018 08:39) (98% - 100%)    I&O's Summary    23 Sep 2018 07:01  -  24 Sep 2018 07:00  --------------------------------------------------------  IN: 1930 mL / OUT: 0 mL / NET: 1930 mL    PHYSICAL EXAM:    Constitutional: NAD, awake and alert, well-developed  HEENT: PERR,  Neck: Soft and supple, No LAD, No JVD  Respiratory: Breath sounds are clear bilaterally, No wheezing, rales or rhonchi  Cardiovascular: S1 and S2, regular rate and rhythm, no Murmurs, gallops or rubs  Gastrointestinal: Bowel Sounds present, soft, nontender, nondistended,   Extremities: No peripheral edema, dressings on the left lateral thigh are clean and dry- aquacel type  Vascular: 2+ peripheral pulses  Neurological: A/O x 1, no focal deficits  Musculoskeletal: left lower extremity with minimal movement secondary to fracture post ORIF  Skin: No rashes    MEDICATIONS:  MEDICATIONS  (STANDING):  cholecalciferol 1000 Unit(s) Oral daily  cyanocobalamin 1000 MICROGram(s) Oral daily  docusate sodium 100 milliGRAM(s) Oral three times a day  enoxaparin Injectable 30 milliGRAM(s) SubCutaneous daily  influenza   Vaccine 0.5 milliLiter(s) IntraMuscular once  lactated ringers. 1000 milliLiter(s) (80 mL/Hr) IV Continuous <Continuous>  senna 2 Tablet(s) Oral at bedtime      LABS: All Labs Reviewed:                        8.2    x     )-----------( x        ( 23 Sep 2018 16:36 )             25.6     09-23    140  |  106  |  21  ----------------------------<  157<H>  4.2   |  24  |  1.09    Ca    8.5      23 Sep 2018 16:36    TPro  7.0  /  Alb  3.3  /  TBili  0.4  /  DBili  x   /  AST  25  /  ALT  16  /  AlkPhos  60  09-22    PT/INR - ( 22 Sep 2018 13:25 )   PT: 12.0 sec;   INR: 1.08 ratio         PTT - ( 22 Sep 2018 13:25 )  PTT:29.6 sec      Blood Culture:     RADIOLOGY/EKG:    DVT PPX:    ADVANCED DIRECTIVE:    DISPOSITION:

## 2018-09-24 NOTE — PROGRESS NOTE ADULT - SUBJECTIVE AND OBJECTIVE BOX
POD#1  Left hip ORIF/IM NAIL    Pt. resting comfortably. No new compliants. denies CP SOB  Vital Signs Last 24 Hrs  T(C): 36.8 (24 Sep 2018 08:39), Max: 36.9 (23 Sep 2018 16:03)  T(F): 98.3 (24 Sep 2018 08:39), Max: 98.5 (24 Sep 2018 05:03)  HR: 116 (24 Sep 2018 08:39) (62 - 116)  BP: 127/71 (24 Sep 2018 08:39) (90/62 - 127/71)  BP(mean): --  RR: 15 (24 Sep 2018 08:39) (15 - 18)  SpO2: 100% (24 Sep 2018 08:39) (98% - 100%)  NAD A&O  Chest: CTA bilat  Cardio S1S2 RRR  Abd. SNT ND  Lhip incision clean and dry aquacells in placwe no drainage noted on dressings  LLE NVI sensory intact +DP +EHL    AM LAbs pending

## 2018-09-24 NOTE — PROGRESS NOTE ADULT - SUBJECTIVE AND OBJECTIVE BOX
Patient noted to have low Hb this am of 6.9. Contacted the son, health proxy who agrees to and gave consent to nurse for transfusion. Also wishes as per patient MOLST form for DNR/DNI- order placed. Discussion of placement in [facility] post discharge as they recently lost her home aide. Discussed with .

## 2018-09-24 NOTE — BRIEF OPERATIVE NOTE - PROCEDURE
<<-----Click on this checkbox to enter Procedure Intramedullary insertion of intertrochanteric antegrade nail into hip  09/24/2018  Cephalomedullary nailing of left hip intertrochanteric/subtrochanteric fracture  Active  JSTEINVU

## 2018-09-24 NOTE — BRIEF OPERATIVE NOTE - PRE-OP DX
Closed displaced intertrochanteric fracture of left femur, initial encounter  09/24/2018    Active  Kofi Dunn

## 2018-09-25 LAB
ANION GAP SERPL CALC-SCNC: 7 MMOL/L — SIGNIFICANT CHANGE UP (ref 5–17)
BUN SERPL-MCNC: 17 MG/DL — SIGNIFICANT CHANGE UP (ref 7–23)
CALCIUM SERPL-MCNC: 7.8 MG/DL — LOW (ref 8.4–10.5)
CHLORIDE SERPL-SCNC: 108 MMOL/L — SIGNIFICANT CHANGE UP (ref 96–108)
CO2 SERPL-SCNC: 26 MMOL/L — SIGNIFICANT CHANGE UP (ref 22–31)
CREAT SERPL-MCNC: 0.93 MG/DL — SIGNIFICANT CHANGE UP (ref 0.5–1.3)
GLUCOSE SERPL-MCNC: 104 MG/DL — HIGH (ref 70–99)
HCT VFR BLD CALC: 22.7 % — LOW (ref 34.5–45)
HGB BLD-MCNC: 7.5 G/DL — LOW (ref 11.5–15.5)
MCHC RBC-ENTMCNC: 32.1 PG — SIGNIFICANT CHANGE UP (ref 27–34)
MCHC RBC-ENTMCNC: 33.3 GM/DL — SIGNIFICANT CHANGE UP (ref 32–36)
MCV RBC AUTO: 96.4 FL — SIGNIFICANT CHANGE UP (ref 80–100)
PLATELET # BLD AUTO: 130 K/UL — LOW (ref 150–400)
POTASSIUM SERPL-MCNC: 3.7 MMOL/L — SIGNIFICANT CHANGE UP (ref 3.5–5.3)
POTASSIUM SERPL-SCNC: 3.7 MMOL/L — SIGNIFICANT CHANGE UP (ref 3.5–5.3)
RBC # BLD: 2.35 M/UL — LOW (ref 3.8–5.2)
RBC # FLD: 13.6 % — SIGNIFICANT CHANGE UP (ref 10.3–14.5)
SODIUM SERPL-SCNC: 141 MMOL/L — SIGNIFICANT CHANGE UP (ref 135–145)
WBC # BLD: 10.8 K/UL — HIGH (ref 3.8–10.5)
WBC # FLD AUTO: 10.8 K/UL — HIGH (ref 3.8–10.5)

## 2018-09-25 PROCEDURE — 99232 SBSQ HOSP IP/OBS MODERATE 35: CPT

## 2018-09-25 RX ADMIN — Medication 100 MILLIGRAM(S): at 06:04

## 2018-09-25 RX ADMIN — ENOXAPARIN SODIUM 30 MILLIGRAM(S): 100 INJECTION SUBCUTANEOUS at 12:52

## 2018-09-25 RX ADMIN — PREGABALIN 1000 MICROGRAM(S): 225 CAPSULE ORAL at 12:52

## 2018-09-25 RX ADMIN — Medication 650 MILLIGRAM(S): at 17:04

## 2018-09-25 RX ADMIN — Medication 650 MILLIGRAM(S): at 16:45

## 2018-09-25 RX ADMIN — SENNA PLUS 2 TABLET(S): 8.6 TABLET ORAL at 21:44

## 2018-09-25 RX ADMIN — Medication 100 MILLIGRAM(S): at 21:44

## 2018-09-25 RX ADMIN — Medication 1000 UNIT(S): at 12:52

## 2018-09-25 NOTE — PROGRESS NOTE ADULT - SUBJECTIVE AND OBJECTIVE BOX
CHIEF COMPLAINT:  fracture left femur  SUBJECTIVE:   89 yo s/p fall with fracture of left femur, ORIF on 9/23, with secondary anemia post transfusion, with improvement to hb of 7.5.   REVIEW OF SYSTEMS:     CONSTITUTIONAL: No weakness, fevers or chills  NECK: No pain   RESPIRATORY: No cough, wheezing, No shortness of breath  CARDIOVASCULAR: No chest pain or palpitations  GASTROINTESTINAL: No abdominal or epigastric pain. No nausea, vomiting, diarrhea or constipation.   extremities no additional pain or tenderness of the left leg no calf pain or swelling.   NEUROLOGICAL: No numbness or weakness  All other review of systems is negative unless indicated above    Vital Signs Last 24 Hrs  T(C): 36.9 (25 Sep 2018 06:02), Max: 37 (24 Sep 2018 16:10)  T(F): 98.5 (25 Sep 2018 06:02), Max: 98.6 (24 Sep 2018 16:10)  HR: 84 (25 Sep 2018 06:02) (80 - 116)  BP: 105/56 (25 Sep 2018 06:02) (95/35 - 127/71)  BP(mean): --  RR: 14 (25 Sep 2018 06:02) (14 - 18)  SpO2: 98% (25 Sep 2018 06:02) (98% - 100%)    I&O's Summary    24 Sep 2018 07:01  -  25 Sep 2018 07:00  --------------------------------------------------------  IN: 1035 mL / OUT: 0 mL / NET: 1035 mL    PHYSICAL EXAM:    Constitutional: NAD, awake and alert, well-developed  HEENT: PERR, EOMI, Normal Hearing, MMM  Neck: Soft and supple, No JVD  Respiratory: Breath sounds are clear bilaterally, No wheezing, rales or rhonchi  Cardiovascular: S1 and S2, regular rate and rhythm, no Murmurs, gallops or rubs  Gastrointestinal: Bowel Sounds present, soft, nontender, nondistended,   Extremities: No peripheral edema,  left thigh with swelling but dressings are dry and intact, nontender  Vascular: 2+ peripheral pulses  Neurological: A/O x 1 person only- " [place] no focal deficits  Musculoskeletal: 3/5 strength b/l upper and lower extremities  Skin: No rashes    MEDICATIONS:  MEDICATIONS  (STANDING):  cholecalciferol 1000 Unit(s) Oral daily  cyanocobalamin 1000 MICROGram(s) Oral daily  docusate sodium 100 milliGRAM(s) Oral three times a day  enoxaparin Injectable 30 milliGRAM(s) SubCutaneous daily  influenza   Vaccine 0.5 milliLiter(s) IntraMuscular once  lactated ringers. 1000 milliLiter(s) (80 mL/Hr) IV Continuous <Continuous>  senna 2 Tablet(s) Oral at bedtime      LABS: All Labs Reviewed:                        7.5    10.8  )-----------( 130      ( 25 Sep 2018 05:40 )             22.7     09-25    141  |  108  |  17  ----------------------------<  104<H>  3.7   |  26  |  0.93    Ca    7.8<L>      25 Sep 2018 05:40      DVT PPX: lovenox and PAS stockings in place    ADVANCED DIRECTIVE: DNR/DNI with MOLST and proxy on the chart    DISPOSITION: for [facility] likely tomorrow once assure Hb is stable

## 2018-09-25 NOTE — PROGRESS NOTE ADULT - SUBJECTIVE AND OBJECTIVE BOX
STATUS POST: fall/left intertroch hip fx/ORIF (nail)     POST OPERATIVE DAY: #2     Vital Signs Last 24 Hrs  T(C): 36.9 (25 Sep 2018 06:02), Max: 37 (24 Sep 2018 16:10)  T(F): 98.5 (25 Sep 2018 06:02), Max: 98.6 (24 Sep 2018 16:10)  HR: 84 (25 Sep 2018 06:02) (80 - 93)  BP: 105/56 (25 Sep 2018 06:02) (95/35 - 113/54)  BP(mean): --  RR: 14 (25 Sep 2018 06:02) (14 - 18)  SpO2: 98% (25 Sep 2018 06:02) (98% - 100%)      SUBJECTIVE: Pt seen sitting up in bed, eating breakfast; denies hip pain; +OOB yesterday but fatigued easily; s/p 1unit PRBC for Hgb<7; denies CP/SOB, N/V or abd pain; voiding well (incontinent); +BM yesterday    General Appearance: appears younger than stated age; alert, follows commands, vague historian w memory deficits noted  HEENT; mucosa moist, no pallor  Chest: equal, clear breath sounds bilat, good excursion  CV: regular S1S2 @ 86 presently; no murmur/ectopy appreciated  Abdomen: +BS, soft, non-tense, non-tender  Extremities: Aquacell x 2 left hip, C&D; 1-2+ left thigh edema, soft, supple; DP pulses +2 bilat; distal motor BLE intact; sensation intact; PAS stockings donned     I&O's Summary: noted    MEDICATIONS): noted    LABS:                        7.5    10.8  )-----------( 130      ( 25 Sep 2018 05:40 )             22.7     09-25    141  |  108  |  17  ----------------------------<  104<H>  3.7   |  26  |  0.93    Ca    7.8<L>      25 Sep 2018 05:40    A: s/p left hip fx/ORIF (nail) POD#2      post-op anemia s/p 1unti PRBC      CKI stable w hydration      dementia      B12/Vit D deficiency on supplements  P: Consider 2nd unit PRBC-D/W Dr. Fredy MORRIS w PT, WBAT on LLE      Lovenox for DVT proph (renal dose)     Check labs in a.m., [facility] when medically stable STATUS POST: fall/left intertroch hip fx/ORIF (nail)     POST OPERATIVE DAY: #2     Vital Signs Last 24 Hrs  T(C): 36.9 (25 Sep 2018 06:02), Max: 37 (24 Sep 2018 16:10)  T(F): 98.5 (25 Sep 2018 06:02), Max: 98.6 (24 Sep 2018 16:10)  HR: 84 (25 Sep 2018 06:02) (80 - 93)  BP: 105/56 (25 Sep 2018 06:02) (95/35 - 113/54)  BP(mean): --  RR: 14 (25 Sep 2018 06:02) (14 - 18)  SpO2: 98% (25 Sep 2018 06:02) (98% - 100%)      SUBJECTIVE: Pt seen sitting up in bed, eating breakfast; denies hip pain; +OOB yesterday but fatigued easily; s/p 1unit PRBC for Hgb<7; denies CP/SOB, N/V or abd pain; voiding well (incontinent); +BM yesterday    General Appearance: appears younger than stated age; alert, follows commands, vague historian w memory deficits noted  HEENT; mucosa moist, no pallor  Chest: equal, clear breath sounds bilat, good excursion  CV: regular S1S2 @ 86 presently; no murmur/ectopy appreciated  Abdomen: +BS, soft, non-tense, non-tender  Extremities: Aquacell x 2 left hip, C&D; 1-2+ left thigh edema, soft, supple; DP pulses +2 bilat; distal motor BLE intact; sensation intact; PAS stockings donned     I&O's Summary: noted    MEDICATIONS): noted    LABS:                        7.5    10.8  )-----------( 130      ( 25 Sep 2018 05:40 )             22.7     09-25    141  |  108  |  17  ----------------------------<  104<H>  3.7   |  26  |  0.93    Ca    7.8<L>      25 Sep 2018 05:40    A: s/p left hip fx/ORIF (nail) POD#2      post-op anemia s/p 1unti PRBC      CKI stable w hydration      dementia      B12/Vit D deficiency on supplements  P: Consider 2nd unit PRBC-D/W Dr. Dunn who favors 2nd unit due to severity of fracture      OOB w PT, WBAT on LLE      Lovenox for DVT proph (renal dose)     Check labs in a.m., [facility] when medically stable

## 2018-09-26 ENCOUNTER — TRANSCRIPTION ENCOUNTER (OUTPATIENT)
Age: 83
End: 2018-09-26

## 2018-09-26 VITALS
OXYGEN SATURATION: 95 % | HEART RATE: 75 BPM | SYSTOLIC BLOOD PRESSURE: 104 MMHG | DIASTOLIC BLOOD PRESSURE: 73 MMHG | RESPIRATION RATE: 14 BRPM | TEMPERATURE: 98 F

## 2018-09-26 LAB
ANION GAP SERPL CALC-SCNC: 7 MMOL/L — SIGNIFICANT CHANGE UP (ref 5–17)
BUN SERPL-MCNC: 15 MG/DL — SIGNIFICANT CHANGE UP (ref 7–23)
CALCIUM SERPL-MCNC: 8 MG/DL — LOW (ref 8.4–10.5)
CHLORIDE SERPL-SCNC: 106 MMOL/L — SIGNIFICANT CHANGE UP (ref 96–108)
CO2 SERPL-SCNC: 25 MMOL/L — SIGNIFICANT CHANGE UP (ref 22–31)
CREAT SERPL-MCNC: 0.86 MG/DL — SIGNIFICANT CHANGE UP (ref 0.5–1.3)
GLUCOSE SERPL-MCNC: 96 MG/DL — SIGNIFICANT CHANGE UP (ref 70–99)
HCT VFR BLD CALC: 27.1 % — LOW (ref 34.5–45)
HGB BLD-MCNC: 9.2 G/DL — LOW (ref 11.5–15.5)
MCHC RBC-ENTMCNC: 32.3 PG — SIGNIFICANT CHANGE UP (ref 27–34)
MCHC RBC-ENTMCNC: 33.9 GM/DL — SIGNIFICANT CHANGE UP (ref 32–36)
MCV RBC AUTO: 95.2 FL — SIGNIFICANT CHANGE UP (ref 80–100)
PLATELET # BLD AUTO: 144 K/UL — LOW (ref 150–400)
POTASSIUM SERPL-MCNC: 3.6 MMOL/L — SIGNIFICANT CHANGE UP (ref 3.5–5.3)
POTASSIUM SERPL-SCNC: 3.6 MMOL/L — SIGNIFICANT CHANGE UP (ref 3.5–5.3)
RBC # BLD: 2.85 M/UL — LOW (ref 3.8–5.2)
RBC # FLD: 14 % — SIGNIFICANT CHANGE UP (ref 10.3–14.5)
SODIUM SERPL-SCNC: 138 MMOL/L — SIGNIFICANT CHANGE UP (ref 135–145)
WBC # BLD: 10.2 K/UL — SIGNIFICANT CHANGE UP (ref 3.8–10.5)
WBC # FLD AUTO: 10.2 K/UL — SIGNIFICANT CHANGE UP (ref 3.8–10.5)

## 2018-09-26 PROCEDURE — 99238 HOSP IP/OBS DSCHRG MGMT 30/<: CPT

## 2018-09-26 RX ORDER — ACETAMINOPHEN 500 MG
1 TABLET ORAL
Qty: 0 | Refills: 0 | COMMUNITY

## 2018-09-26 RX ORDER — SENNA PLUS 8.6 MG/1
2 TABLET ORAL
Qty: 0 | Refills: 0 | DISCHARGE
Start: 2018-09-26

## 2018-09-26 RX ORDER — DOCUSATE SODIUM 100 MG
1 CAPSULE ORAL
Qty: 0 | Refills: 0 | DISCHARGE
Start: 2018-09-26

## 2018-09-26 RX ORDER — ACETAMINOPHEN 500 MG
2 TABLET ORAL
Qty: 0 | Refills: 0 | DISCHARGE
Start: 2018-09-26

## 2018-09-26 RX ORDER — PREGABALIN 225 MG/1
1 CAPSULE ORAL
Qty: 0 | Refills: 0 | DISCHARGE
Start: 2018-09-26

## 2018-09-26 RX ORDER — CHOLECALCIFEROL (VITAMIN D3) 125 MCG
1000 CAPSULE ORAL
Qty: 0 | Refills: 0 | DISCHARGE
Start: 2018-09-26

## 2018-09-26 RX ADMIN — Medication 650 MILLIGRAM(S): at 10:26

## 2018-09-26 RX ADMIN — INFLUENZA VIRUS VACCINE 0.5 MILLILITER(S): 15; 15; 15; 15 SUSPENSION INTRAMUSCULAR at 11:47

## 2018-09-26 RX ADMIN — Medication 650 MILLIGRAM(S): at 09:22

## 2018-09-26 RX ADMIN — ENOXAPARIN SODIUM 30 MILLIGRAM(S): 100 INJECTION SUBCUTANEOUS at 11:05

## 2018-09-26 RX ADMIN — PREGABALIN 1000 MICROGRAM(S): 225 CAPSULE ORAL at 09:23

## 2018-09-26 RX ADMIN — Medication 1000 UNIT(S): at 09:23

## 2018-09-26 RX ADMIN — Medication 100 MILLIGRAM(S): at 09:22

## 2018-09-26 RX ADMIN — Medication 100 MILLIGRAM(S): at 05:44

## 2018-09-26 NOTE — DISCHARGE NOTE ADULT - NS AS ACTIVITY OBS
Walking-Outdoors allowed/Showering allowed/Stairs allowed/Do not drive or operate machinery/Walking-Indoors allowed/No Heavy lifting/straining

## 2018-09-26 NOTE — DISCHARGE NOTE ADULT - HOSPITAL COURSE
89 y/o w/female with h/o mild dementia admitted 9/22/18 for mechanical fall, sustaining left intertrochanteric hip fracture; underwent left hip ORIF (nail) performed by Dr. ARA Dunn on 9/23/18; post-op course significant for acute blood loss anemia requiring 2 units PRBC blood transfusion which she received without incident. She is WBAT on the left lower extremity, OOB with PT and medically stable for discharge to [facility] for continued strengthening, gait and ADL training,, weight-bear as tolerated on the left lower extremity; Lovenox 30mg sq daily for DVT prophylaxis x 2 weeks, then ASA 81mg po daily to be continued as prior to her hip surgery.

## 2018-09-26 NOTE — PROGRESS NOTE ADULT - SUBJECTIVE AND OBJECTIVE BOX
STATUS POST: fall/left hip fracture/ORIF (nail)     POST OPERATIVE DAY: #3     Vital Signs Last 24 Hrs  T(C): 36.7 (26 Sep 2018 05:15), Max: 37.2 (25 Sep 2018 16:07)  T(F): 98 (26 Sep 2018 05:15), Max: 99 (25 Sep 2018 16:07)  HR: 68 (26 Sep 2018 05:15) (68 - 82)  BP: 118/47 (26 Sep 2018 05:15) (98/73 - 128/45)  BP(mean): --  RR: 16 (26 Sep 2018 05:15) (15 - 16)  SpO2: 97% (26 Sep 2018 05:15) (96% - 100%)      SUBJECTIVE: Pt seen sitting up in bed eating breakfast with good appetite; offers no complaints; denies left hip pain presently; received 2nd unit PRBC for post-op blood loss anemia; denies dizziness, CP, SOB; voiding well, +BM yesterday; +OOB w PT    General Appearance: Appears well, alert, talkative in NAD  HEENT: mucosa moist; no pallor  Chest: equal, clear breath sounds bilat  CV: regular S1S2 @ 80 presently; no murmur/gallop  Abdomen: +BS, soft, non-tender, non-tense  Extremities: 1+ left thigh edema, supple, non-tender; DP pulses +BLE; distal sensory/motor intact     I&O's Summary: noted    MEDICATIONS: noted    LABS: improved                        9.2    10.2  )-----------( 144      ( 26 Sep 2018 05:50 )             27.1     09-26    138  |  106  |  15  ----------------------------<  96  3.6   |  25  |  0.86    Ca    8.0<L>      26 Sep 2018 05:50    A: left hip fx/ORIF (nail)      acute blood loss anemia-improved      dementia      Vit D/VitB12 deficiency  P: Continue to mobilize w PT/WBAT on LLE      Lovenox for DVT prophylaxis x 2wks, then [id number] po daily (pt on pre-operatively) STATUS POST: fall/left hip fracture/ORIF (nail)     POST OPERATIVE DAY: #3     Vital Signs Last 24 Hrs  T(C): 36.7 (26 Sep 2018 05:15), Max: 37.2 (25 Sep 2018 16:07)  T(F): 98 (26 Sep 2018 05:15), Max: 99 (25 Sep 2018 16:07)  HR: 68 (26 Sep 2018 05:15) (68 - 82)  BP: 118/47 (26 Sep 2018 05:15) (98/73 - 128/45)  BP(mean): --  RR: 16 (26 Sep 2018 05:15) (15 - 16)  SpO2: 97% (26 Sep 2018 05:15) (96% - 100%)      SUBJECTIVE: Pt seen sitting up in bed eating breakfast with good appetite; offers no complaints; denies left hip pain presently; received 2nd unit PRBC for post-op blood loss anemia; denies dizziness, CP, SOB; voiding well, +BM yesterday; +OOB w PT    General Appearance: Appears well, alert, talkative in NAD  HEENT: mucosa moist; no pallor  Chest: equal, clear breath sounds bilat  CV: regular S1S2 @ 80 presently; no murmur/gallop  Abdomen: +BS, soft, non-tender, non-tense  Extremities: Aquacell in place (small amt blood-stained); 1+ left thigh edema, supple, non-tender; DP pulses +BLE; distal sensory/motor intact     I&O's Summary: noted    MEDICATIONS: noted    LABS: improved                        9.2    10.2  )-----------( 144      ( 26 Sep 2018 05:50 )             27.1     09-26    138  |  106  |  15  ----------------------------<  96  3.6   |  25  |  0.86    Ca    8.0<L>      26 Sep 2018 05:50    A: left hip fx/ORIF (nail)      acute blood loss anemia-improved      dementia      Vit D/VitB12 deficiency  P: Continue to mobilize w PT/WBAT on LLE      Lovenox for DVT prophylaxis x 2wks, then ASA 81mg po daily (pt on pre-operatively)      [facility] when bed available

## 2018-09-26 NOTE — DISCHARGE NOTE ADULT - PLAN OF CARE
decrease pain and improve functional staus ORIF left hip fracture (nail) prevent recurrence Physical therapy for gait training/ strengthening

## 2018-09-26 NOTE — DISCHARGE NOTE ADULT - PATIENT PORTAL LINK FT
You can access the [facility], offered by [facility], by registering with the following website: [email or web address]

## 2018-09-26 NOTE — DISCHARGE NOTE ADULT - CARE PROVIDERS DIRECT ADDRESSES
[email or web address]

## 2018-09-26 NOTE — PROGRESS NOTE ADULT - PROBLEM SELECTOR PROBLEM 1
Closed fracture of left femur, unspecified fracture morphology, unspecified portion of femur, initial encounter
Closed displaced intertrochanteric fracture of left femur, initial encounter

## 2018-09-26 NOTE — DISCHARGE NOTE ADULT - ADDITIONAL INSTRUCTIONS
Follow-up Dr. Daniel within 1 week of discharge from [facility]  Follow-up Dr. Dunn (Orthopedics) for staple removal POD#14 (10/08/18)

## 2018-09-26 NOTE — PROGRESS NOTE ADULT - PROBLEM SELECTOR PLAN 1
OR for 8:20 this am for ORIF left hip non-displaced fracture of the femur
PT at [facility] prior to going home, increase strength and ability to function with single aid at home
PT evaluation today- will discuss with family options post discharge, discuss with case management
PT for improved ambulation- using walker prior to fall
WBAT LLE PT/OT EVAl TODAY.  PAIN CONTROLLED   CHECK ON LABS  DISCHARGE PLAN TO REHAB  CONTINUE CURRENT MGMT.

## 2018-09-26 NOTE — DISCHARGE NOTE ADULT - CARE PROVIDER_API CALL
Yoana Rosales), Family Medicine  [address] [place] [address]  Phone: [phone]  Fax: [phone]    Kofi Dunn), Orthopaedic Sports Medicine; Orthopaedic Surgery  [address] [address]  Phone: [phone]  Fax: [phone]

## 2018-09-26 NOTE — DISCHARGE NOTE ADULT - MEDICATION SUMMARY - MEDICATIONS TO TAKE
I will START or STAY ON the medications listed below when I get home from the hospital:    acetaminophen 325 mg oral tablet  -- 2 tab(s) by mouth every 6 hours, As needed, Mild Pain (1 - 3)  -- Indication: For pain management    oxyCODONE-acetaminophen 5 mg-325 mg oral tablet  -- 1 tab(s) by mouth every 4 hours, As needed, Moderate Pain (4 - 6)  -- Indication: For pain mangement    Aspirin Enteric Coated 81 mg oral delayed release tablet  -- 1 tab(s) by mouth once a day to start 10/08/18 (D/C Lovenox 10/07/18)  -- Indication: For DVT/cardiac prophylaxis START 10/08/18    enoxaparin  -- 30 milligram(s) subcutaneous once a day  -- Indication: For DVT prophylaxis STOP 10/07/18    senna oral tablet  -- 2 tab(s) by mouth once a day (at bedtime)  -- Indication: For bowel regimen    docusate sodium 100 mg oral capsule  -- 1 cap(s) by mouth 3 times a day  -- Indication: For Stool softener    cyanocobalamin 1000 mcg oral tablet  -- 1 tab(s) by mouth once a day  -- Indication: For Supplement    cholecalciferol oral tablet  -- 1000 unit(s) by mouth once a day  -- Indication: For Supplement    Vitamin B12 1000 mcg oral tablet  -- 1 tab(s) by mouth once a day  -- Indication: For Supplement    Vitamin D3  -- 1000 unit(s) by mouth once a day  -- Indication: For Supplement

## 2018-09-26 NOTE — PROGRESS NOTE ADULT - PROBLEM SELECTOR PLAN 4
improved post 2 units PRBC's monitor again in 1-2 weeks prior to discharge from [facility], follow up appointment in my office 1 week post discharge from [facility]

## 2018-09-26 NOTE — DISCHARGE NOTE ADULT - CARE PLAN
Principal Discharge DX:	Closed displaced intertrochanteric fracture of left femur, initial encounter  Goal:	decrease pain and improve functional staus  Assessment and plan of treatment:	ORIF left hip fracture (nail)  Secondary Diagnosis:	Fall  Goal:	prevent recurrence  Assessment and plan of treatment:	Physical therapy for gait training/ strengthening

## 2018-09-26 NOTE — PROGRESS NOTE ADULT - PROBLEM SELECTOR PLAN 2
monitor for any issues- currently pleaseantly confused but stable and at her baseline post anesthesia

## 2018-09-26 NOTE — PROGRESS NOTE ADULT - SUBJECTIVE AND OBJECTIVE BOX
CHIEF COMPLAINT: fracture left femur post fall at home    SUBJECTIVE: 89 yo with mechanical fall at home and fracture of the left femur doing well and without complaints of pain in the leg, chest pain, palpitations, or sob.     REVIEW OF SYSTEMS:    CONSTITUTIONAL: No weakness, fevers or chills   NECK: No pain or stiffness  RESPIRATORY: No cough, wheezing, hemoptysis; No shortness of breath  CARDIOVASCULAR: No chest pain or palpitations  GASTROINTESTINAL: No abdominal or epigastric pain. No nausea, vomiting, diarrhea or constipation.  NEUROLOGICAL: No numbness or weakness    All other review of systems is negative unless indicated above    Vital Signs Last 24 Hrs  T(C): 36.7 (26 Sep 2018 05:15), Max: 37.2 (25 Sep 2018 16:07)  T(F): 98 (26 Sep 2018 05:15), Max: 99 (25 Sep 2018 16:07)  HR: 68 (26 Sep 2018 05:15) (68 - 82)  BP: 118/47 (26 Sep 2018 05:15) (98/73 - 128/45)  BP(mean): --  RR: 16 (26 Sep 2018 05:15) (15 - 16)  SpO2: 97% (26 Sep 2018 05:15) (96% - 100%)    I&O's Summary    25 Sep 2018 07:01  -  26 Sep 2018 07:00  --------------------------------------------------------  IN: 1021 mL / OUT: 0 mL / NET: 1021 mL        PHYSICAL EXAM:    Constitutional: NAD, awake and alert, well-developed  Neck: Soft and supple, No JVD  Respiratory: Breath sounds are clear bilaterally, No wheezing, rales or rhonchi  Cardiovascular: S1 and S2, regular rate and rhythm, no Murmurs, gallops or rubs  Gastrointestinal: Bowel Sounds present, soft, nontender, nondistended,  Extremities: No peripheral edema of calves, edema of the left thigh less than yesterday and swelling is improving, dressings clean and dry  Vascular: 2+ peripheral pulses  Neurological: A/O x 2- not to time, no focal deficits  Musculoskeletal: 3/5 strength b/l upper and lower extremities  Skin: No rashes    MEDICATIONS:  MEDICATIONS  (STANDING):  cholecalciferol 1000 Unit(s) Oral daily  cyanocobalamin 1000 MICROGram(s) Oral daily  docusate sodium 100 milliGRAM(s) Oral three times a day  enoxaparin Injectable 30 milliGRAM(s) SubCutaneous daily  influenza   Vaccine 0.5 milliLiter(s) IntraMuscular once  senna 2 Tablet(s) Oral at bedtime      LABS: All Labs Reviewed:                        9.2    10.2  )-----------( 144      ( 26 Sep 2018 05:50 )             27.1     09-26    138  |  106  |  15  ----------------------------<  96  3.6   |  25  |  0.86    Ca    8.0<L>      26 Sep 2018 05:50            DVT PPX:lovenox for 2 weeks postop then change to low dose aspirin    ADVANCED DIRECTIVE:DNR/DNI    DISPOSITION: discharge to [facility] today when bed available

## 2018-09-26 NOTE — PROGRESS NOTE ADULT - REASON FOR ADMISSION
Fall/Left Hip Fracture
Fall/Right Hip Fracture
Fall/leftHip Fracture
Fall/Right Hip Fracture
Fall/left Hip Fracture
Fall/Right Hip Fracture
Fall/left Hip Fracture
Fall/left Hip Fracture

## 2018-09-26 NOTE — DISCHARGE NOTE ADULT - MEDICATION SUMMARY - MEDICATIONS TO STOP TAKING
I will STOP taking the medications listed below when I get home from the hospital:    acetaminophen 325 mg oral capsule  -- 1 cap(s) by mouth once a day

## 2018-10-02 PROBLEM — Z00.00 ENCOUNTER FOR PREVENTIVE HEALTH EXAMINATION: Noted: 2018-10-02
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
## 2018-10-03 PROBLEM — M25.50 PAIN IN UNSPECIFIED JOINT: Chronic | Status: ACTIVE | Noted: 2018-09-22

## 2018-10-03 PROBLEM — E03.9 HYPOTHYROIDISM, UNSPECIFIED: Chronic | Status: ACTIVE | Noted: 2018-09-22

## 2018-10-03 PROBLEM — F03.90 UNSPECIFIED DEMENTIA WITHOUT BEHAVIORAL DISTURBANCE: Chronic | Status: ACTIVE | Noted: 2018-09-22

## 2018-10-05 ENCOUNTER — APPOINTMENT (OUTPATIENT)
Dept: FAMILY MEDICINE | Facility: CLINIC | Age: 83
End: 2018-10-05

## 2018-10-08 RX ORDER — ASPIRIN/CALCIUM CARB/MAGNESIUM 324 MG
1 TABLET ORAL
Qty: 0 | Refills: 0 | DISCHARGE
Start: 2018-10-08

## 2018-10-18 ENCOUNTER — APPOINTMENT (OUTPATIENT)
Dept: ORTHOPEDIC SURGERY | Facility: CLINIC | Age: 83
End: 2018-10-18
Payer: COMMERCIAL

## 2018-10-18 VITALS — HEIGHT: 62 IN | WEIGHT: 128 LBS | BODY MASS INDEX: 23.55 KG/M2

## 2018-10-18 DIAGNOSIS — S72.142D DISPLACED INTERTROCHANTERIC FRACTURE OF LEFT FEMUR, SUBSEQUENT ENCOUNTER FOR CLOSED FRACTURE WITH ROUTINE HEALING: ICD-10-CM

## 2018-10-18 PROCEDURE — 73502 X-RAY EXAM HIP UNI 2-3 VIEWS: CPT | Mod: LT

## 2018-10-18 PROCEDURE — 99024 POSTOP FOLLOW-UP VISIT: CPT

## 2018-10-24 PROCEDURE — 86901 BLOOD TYPING SEROLOGIC RH(D): CPT

## 2018-10-24 PROCEDURE — 80048 BASIC METABOLIC PNL TOTAL CA: CPT

## 2018-10-24 PROCEDURE — C1889: CPT

## 2018-10-24 PROCEDURE — 86900 BLOOD TYPING SEROLOGIC ABO: CPT

## 2018-10-24 PROCEDURE — 76000 FLUOROSCOPY <1 HR PHYS/QHP: CPT

## 2018-10-24 PROCEDURE — 85730 THROMBOPLASTIN TIME PARTIAL: CPT

## 2018-10-24 PROCEDURE — 99285 EMERGENCY DEPT VISIT HI MDM: CPT | Mod: 25

## 2018-10-24 PROCEDURE — 97162 PT EVAL MOD COMPLEX 30 MIN: CPT

## 2018-10-24 PROCEDURE — 96374 THER/PROPH/DIAG INJ IV PUSH: CPT

## 2018-10-24 PROCEDURE — 80053 COMPREHEN METABOLIC PANEL: CPT

## 2018-10-24 PROCEDURE — 36430 TRANSFUSION BLD/BLD COMPNT: CPT

## 2018-10-24 PROCEDURE — 85014 HEMATOCRIT: CPT

## 2018-10-24 PROCEDURE — 86850 RBC ANTIBODY SCREEN: CPT

## 2018-10-24 PROCEDURE — 85027 COMPLETE CBC AUTOMATED: CPT

## 2018-10-24 PROCEDURE — P9016: CPT

## 2018-10-24 PROCEDURE — 73501 X-RAY EXAM HIP UNI 1 VIEW: CPT

## 2018-10-24 PROCEDURE — 90686 IIV4 VACC NO PRSV 0.5 ML IM: CPT

## 2018-10-24 PROCEDURE — C1769: CPT

## 2018-10-24 PROCEDURE — C1713: CPT

## 2018-10-24 PROCEDURE — 73551 X-RAY EXAM OF FEMUR 1: CPT

## 2018-10-24 PROCEDURE — 85610 PROTHROMBIN TIME: CPT

## 2018-10-24 PROCEDURE — 85018 HEMOGLOBIN: CPT

## 2018-10-24 PROCEDURE — 72170 X-RAY EXAM OF PELVIS: CPT

## 2018-10-24 PROCEDURE — 86923 COMPATIBILITY TEST ELECTRIC: CPT

## 2018-10-24 PROCEDURE — 97530 THERAPEUTIC ACTIVITIES: CPT

## 2019-01-01 ENCOUNTER — TRANSCRIPTION ENCOUNTER (OUTPATIENT)
Age: 84
End: 2019-01-01

## 2019-01-01 ENCOUNTER — INPATIENT (INPATIENT)
Facility: HOSPITAL | Age: 84
LOS: 2 days | Discharge: NURSING, REHAB OR LONG-TERM CARE | DRG: 480 | End: 2019-08-06
Attending: HOSPITALIST | Admitting: HOSPITALIST
Payer: MEDICARE

## 2019-01-01 ENCOUNTER — INPATIENT (INPATIENT)
Facility: HOSPITAL | Age: 84
LOS: 5 days | Discharge: HOME | DRG: 314 | End: 2019-11-20
Attending: INTERNAL MEDICINE | Admitting: INTERNAL MEDICINE
Payer: MEDICARE

## 2019-01-01 VITALS
RESPIRATION RATE: 20 BRPM | DIASTOLIC BLOOD PRESSURE: 74 MMHG | SYSTOLIC BLOOD PRESSURE: 177 MMHG | WEIGHT: 119.93 LBS | HEIGHT: 62 IN | OXYGEN SATURATION: 99 % | HEART RATE: 86 BPM

## 2019-01-01 VITALS
RESPIRATION RATE: 16 BRPM | OXYGEN SATURATION: 98 % | TEMPERATURE: 98 F | DIASTOLIC BLOOD PRESSURE: 46 MMHG | SYSTOLIC BLOOD PRESSURE: 119 MMHG | HEART RATE: 73 BPM

## 2019-01-01 VITALS
OXYGEN SATURATION: 98 % | DIASTOLIC BLOOD PRESSURE: 71 MMHG | HEART RATE: 71 BPM | SYSTOLIC BLOOD PRESSURE: 141 MMHG | TEMPERATURE: 98 F | RESPIRATION RATE: 18 BRPM

## 2019-01-01 VITALS
OXYGEN SATURATION: 97 % | WEIGHT: 110.01 LBS | TEMPERATURE: 98 F | DIASTOLIC BLOOD PRESSURE: 78 MMHG | HEART RATE: 56 BPM | SYSTOLIC BLOOD PRESSURE: 139 MMHG | RESPIRATION RATE: 18 BRPM | HEIGHT: 60 IN

## 2019-01-01 DIAGNOSIS — D50.0 IRON DEFICIENCY ANEMIA SECONDARY TO BLOOD LOSS (CHRONIC): ICD-10-CM

## 2019-01-01 DIAGNOSIS — R79.89 OTHER SPECIFIED ABNORMAL FINDINGS OF BLOOD CHEMISTRY: ICD-10-CM
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
## 2019-01-01 DIAGNOSIS — E03.9 HYPOTHYROIDISM, UNSPECIFIED: ICD-10-CM

## 2019-01-01 DIAGNOSIS — I48.91 UNSPECIFIED ATRIAL FIBRILLATION: ICD-10-CM

## 2019-01-01 DIAGNOSIS — Z98.890 OTHER SPECIFIED POSTPROCEDURAL STATES: Chronic | ICD-10-CM

## 2019-01-01 DIAGNOSIS — F03.90 UNSPECIFIED DEMENTIA WITHOUT BEHAVIORAL DISTURBANCE: ICD-10-CM
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
## 2019-01-01 DIAGNOSIS — S72.001A FRACTURE OF UNSPECIFIED PART OF NECK OF RIGHT FEMUR, INITIAL ENCOUNTER FOR CLOSED FRACTURE: ICD-10-CM

## 2019-01-01 DIAGNOSIS — E89.0 POSTPROCEDURAL HYPOTHYROIDISM: Chronic | ICD-10-CM

## 2019-01-01 DIAGNOSIS — Z98.891 HISTORY OF UTERINE SCAR FROM PREVIOUS SURGERY: Chronic | ICD-10-CM

## 2019-01-01 DIAGNOSIS — M62.82 RHABDOMYOLYSIS: ICD-10-CM

## 2019-01-01 DIAGNOSIS — R94.31 ABNORMAL ELECTROCARDIOGRAM [ECG] [EKG]: ICD-10-CM

## 2019-01-01 LAB
ALBUMIN SERPL ELPH-MCNC: 3.5 G/DL — SIGNIFICANT CHANGE UP (ref 3.3–5)
ALBUMIN SERPL ELPH-MCNC: 3.6 G/DL — SIGNIFICANT CHANGE UP (ref 3.3–5)
ALP SERPL-CCNC: 56 U/L — SIGNIFICANT CHANGE UP (ref 40–120)
ALP SERPL-CCNC: 92 U/L — SIGNIFICANT CHANGE UP (ref 40–120)
ALT FLD-CCNC: 26 U/L — SIGNIFICANT CHANGE UP (ref 10–45)
ALT FLD-CCNC: 7 U/L DA — LOW (ref 10–45)
ANION GAP SERPL CALC-SCNC: 11 MMOL/L — SIGNIFICANT CHANGE UP (ref 5–17)
ANION GAP SERPL CALC-SCNC: 12 MMOL/L — SIGNIFICANT CHANGE UP (ref 5–17)
ANION GAP SERPL CALC-SCNC: 15 MMOL/L — SIGNIFICANT CHANGE UP (ref 5–17)
ANION GAP SERPL CALC-SCNC: 17 MMOL/L — SIGNIFICANT CHANGE UP (ref 5–17)
ANION GAP SERPL CALC-SCNC: 7 MMOL/L — SIGNIFICANT CHANGE UP (ref 5–17)
ANION GAP SERPL CALC-SCNC: 8 MMOL/L — SIGNIFICANT CHANGE UP (ref 5–17)
APPEARANCE UR: CLEAR — SIGNIFICANT CHANGE UP
APTT BLD: 27.9 SEC — SIGNIFICANT CHANGE UP (ref 27.5–36.3)
APTT BLD: 28.8 SEC — SIGNIFICANT CHANGE UP (ref 27.5–36.3)
AST SERPL-CCNC: 25 U/L — SIGNIFICANT CHANGE UP (ref 10–40)
AST SERPL-CCNC: 42 U/L — HIGH (ref 10–40)
BACTERIA # UR AUTO: 0 — SIGNIFICANT CHANGE UP
BASOPHILS # BLD AUTO: 0.02 K/UL — SIGNIFICANT CHANGE UP (ref 0–0.2)
BASOPHILS # BLD AUTO: 0.06 K/UL — SIGNIFICANT CHANGE UP (ref 0–0.2)
BASOPHILS NFR BLD AUTO: 0.1 % — SIGNIFICANT CHANGE UP (ref 0–2)
BASOPHILS NFR BLD AUTO: 0.8 % — SIGNIFICANT CHANGE UP (ref 0–2)
BILIRUB SERPL-MCNC: 0.4 MG/DL — SIGNIFICANT CHANGE UP (ref 0.2–1.2)
BILIRUB SERPL-MCNC: 0.6 MG/DL — SIGNIFICANT CHANGE UP (ref 0.2–1.2)
BILIRUB UR-MCNC: NEGATIVE — SIGNIFICANT CHANGE UP
BLD GP AB SCN SERPL QL: NEGATIVE — SIGNIFICANT CHANGE UP
BLD GP AB SCN SERPL QL: SIGNIFICANT CHANGE UP
BUN SERPL-MCNC: 13 MG/DL — SIGNIFICANT CHANGE UP (ref 7–23)
BUN SERPL-MCNC: 14 MG/DL — SIGNIFICANT CHANGE UP (ref 7–23)
BUN SERPL-MCNC: 15 MG/DL — SIGNIFICANT CHANGE UP (ref 7–23)
BUN SERPL-MCNC: 17 MG/DL — SIGNIFICANT CHANGE UP (ref 7–23)
BUN SERPL-MCNC: 22 MG/DL — SIGNIFICANT CHANGE UP (ref 7–23)
BUN SERPL-MCNC: 26 MG/DL — HIGH (ref 7–23)
BUN SERPL-MCNC: 26 MG/DL — HIGH (ref 7–23)
BUN SERPL-MCNC: 27 MG/DL — HIGH (ref 7–23)
CALCIUM SERPL-MCNC: 8 MG/DL — LOW (ref 8.4–10.5)
CALCIUM SERPL-MCNC: 8.1 MG/DL — LOW (ref 8.4–10.5)
CALCIUM SERPL-MCNC: 8.3 MG/DL — LOW (ref 8.4–10.5)
CALCIUM SERPL-MCNC: 9 MG/DL — SIGNIFICANT CHANGE UP (ref 8.4–10.5)
CALCIUM SERPL-MCNC: 9.1 MG/DL — SIGNIFICANT CHANGE UP (ref 8.4–10.5)
CALCIUM SERPL-MCNC: 9.2 MG/DL — SIGNIFICANT CHANGE UP (ref 8.4–10.5)
CALCIUM SERPL-MCNC: 9.3 MG/DL — SIGNIFICANT CHANGE UP (ref 8.4–10.5)
CALCIUM SERPL-MCNC: 9.6 MG/DL — SIGNIFICANT CHANGE UP (ref 8.4–10.5)
CHLORIDE SERPL-SCNC: 100 MMOL/L — SIGNIFICANT CHANGE UP (ref 96–108)
CHLORIDE SERPL-SCNC: 100 MMOL/L — SIGNIFICANT CHANGE UP (ref 96–108)
CHLORIDE SERPL-SCNC: 101 MMOL/L — SIGNIFICANT CHANGE UP (ref 96–108)
CHLORIDE SERPL-SCNC: 104 MMOL/L — SIGNIFICANT CHANGE UP (ref 96–108)
CHLORIDE SERPL-SCNC: 105 MMOL/L — SIGNIFICANT CHANGE UP (ref 96–108)
CHLORIDE SERPL-SCNC: 95 MMOL/L — LOW (ref 96–108)
CK MB BLD-MCNC: 1 % — SIGNIFICANT CHANGE UP (ref 0–3.5)
CK MB BLD-MCNC: 2.2 % — SIGNIFICANT CHANGE UP (ref 0–3.5)
CK MB CFR SERPL CALC: 11.7 NG/ML — HIGH (ref 0–3.8)
CK MB CFR SERPL CALC: 7.6 NG/ML — HIGH (ref 0–3.8)
CK SERPL-CCNC: 523 U/L — HIGH (ref 25–170)
CK SERPL-CCNC: 618 U/L — HIGH (ref 25–170)
CK SERPL-CCNC: 793 U/L — HIGH (ref 25–170)
CO2 SERPL-SCNC: 24 MMOL/L — SIGNIFICANT CHANGE UP (ref 22–31)
CO2 SERPL-SCNC: 25 MMOL/L — SIGNIFICANT CHANGE UP (ref 22–31)
CO2 SERPL-SCNC: 26 MMOL/L — SIGNIFICANT CHANGE UP (ref 22–31)
CO2 SERPL-SCNC: 26 MMOL/L — SIGNIFICANT CHANGE UP (ref 22–31)
CO2 SERPL-SCNC: 27 MMOL/L — SIGNIFICANT CHANGE UP (ref 22–31)
COLOR SPEC: YELLOW — SIGNIFICANT CHANGE UP
CREAT SERPL-MCNC: 0.59 MG/DL — SIGNIFICANT CHANGE UP (ref 0.5–1.3)
CREAT SERPL-MCNC: 0.69 MG/DL — SIGNIFICANT CHANGE UP (ref 0.5–1.3)
CREAT SERPL-MCNC: 0.76 MG/DL — SIGNIFICANT CHANGE UP (ref 0.5–1.3)
CREAT SERPL-MCNC: 0.8 MG/DL — SIGNIFICANT CHANGE UP (ref 0.5–1.3)
CREAT SERPL-MCNC: 0.81 MG/DL — SIGNIFICANT CHANGE UP (ref 0.5–1.3)
CREAT SERPL-MCNC: 0.91 MG/DL — SIGNIFICANT CHANGE UP (ref 0.5–1.3)
CREAT SERPL-MCNC: 1.04 MG/DL — SIGNIFICANT CHANGE UP (ref 0.5–1.3)
CREAT SERPL-MCNC: 1.23 MG/DL — SIGNIFICANT CHANGE UP (ref 0.5–1.3)
CRP SERPL-MCNC: 22.33 MG/DL — HIGH (ref 0–0.4)
CULTURE RESULTS: NO GROWTH — SIGNIFICANT CHANGE UP
DIFF PNL FLD: NEGATIVE — SIGNIFICANT CHANGE UP
EOSINOPHIL # BLD AUTO: 0.01 K/UL — SIGNIFICANT CHANGE UP (ref 0–0.5)
EOSINOPHIL # BLD AUTO: 0.3 K/UL — SIGNIFICANT CHANGE UP (ref 0–0.5)
EOSINOPHIL NFR BLD AUTO: 0 % — SIGNIFICANT CHANGE UP (ref 0–6)
EOSINOPHIL NFR BLD AUTO: 4.1 % — SIGNIFICANT CHANGE UP (ref 0–6)
EPI CELLS # UR: 0 /HPF — SIGNIFICANT CHANGE UP
ERYTHROCYTE [SEDIMENTATION RATE] IN BLOOD: 101 MM/HR — HIGH (ref 0–20)
GLUCOSE SERPL-MCNC: 100 MG/DL — HIGH (ref 70–99)
GLUCOSE SERPL-MCNC: 104 MG/DL — HIGH (ref 70–99)
GLUCOSE SERPL-MCNC: 106 MG/DL — HIGH (ref 70–99)
GLUCOSE SERPL-MCNC: 106 MG/DL — HIGH (ref 70–99)
GLUCOSE SERPL-MCNC: 108 MG/DL — HIGH (ref 70–99)
GLUCOSE SERPL-MCNC: 112 MG/DL — HIGH (ref 70–99)
GLUCOSE SERPL-MCNC: 134 MG/DL — HIGH (ref 70–99)
GLUCOSE SERPL-MCNC: 137 MG/DL — HIGH (ref 70–99)
GLUCOSE UR QL: NEGATIVE — SIGNIFICANT CHANGE UP
HCT VFR BLD CALC: 21.6 % — LOW (ref 34.5–45)
HCT VFR BLD CALC: 25.6 % — LOW (ref 34.5–45)
HCT VFR BLD CALC: 26.2 % — LOW (ref 34.5–45)
HCT VFR BLD CALC: 27.9 % — LOW (ref 34.5–45)
HCT VFR BLD CALC: 29.6 % — LOW (ref 34.5–45)
HCT VFR BLD CALC: 32 % — LOW (ref 34.5–45)
HCT VFR BLD CALC: 32.6 % — LOW (ref 34.5–45)
HCT VFR BLD CALC: 36.3 % — SIGNIFICANT CHANGE UP (ref 34.5–45)
HCT VFR BLD CALC: 41.2 % — SIGNIFICANT CHANGE UP (ref 34.5–45)
HGB BLD-MCNC: 10 G/DL — LOW (ref 11.5–15.5)
HGB BLD-MCNC: 10.1 G/DL — LOW (ref 11.5–15.5)
HGB BLD-MCNC: 12.2 G/DL — SIGNIFICANT CHANGE UP (ref 11.5–15.5)
HGB BLD-MCNC: 13.6 G/DL — SIGNIFICANT CHANGE UP (ref 11.5–15.5)
HGB BLD-MCNC: 7.2 G/DL — LOW (ref 11.5–15.5)
HGB BLD-MCNC: 8.6 G/DL — LOW (ref 11.5–15.5)
HGB BLD-MCNC: 8.8 G/DL — LOW (ref 11.5–15.5)
HGB BLD-MCNC: 9.1 G/DL — LOW (ref 11.5–15.5)
HGB BLD-MCNC: 9.8 G/DL — LOW (ref 11.5–15.5)
HYALINE CASTS # UR AUTO: 0 /LPF — SIGNIFICANT CHANGE UP (ref 0–2)
IMM GRANULOCYTES NFR BLD AUTO: 0.4 % — SIGNIFICANT CHANGE UP (ref 0–1.5)
IMM GRANULOCYTES NFR BLD AUTO: 0.5 % — SIGNIFICANT CHANGE UP (ref 0–1.5)
INR BLD: 1.03 RATIO — SIGNIFICANT CHANGE UP (ref 0.88–1.16)
INR BLD: 1.06 RATIO — SIGNIFICANT CHANGE UP (ref 0.88–1.16)
KETONES UR-MCNC: SIGNIFICANT CHANGE UP
LEUKOCYTE ESTERASE UR-ACNC: NEGATIVE — SIGNIFICANT CHANGE UP
LYMPHOCYTES # BLD AUTO: 0.92 K/UL — LOW (ref 1–3.3)
LYMPHOCYTES # BLD AUTO: 1.82 K/UL — SIGNIFICANT CHANGE UP (ref 1–3.3)
LYMPHOCYTES # BLD AUTO: 24.8 % — SIGNIFICANT CHANGE UP (ref 13–44)
LYMPHOCYTES # BLD AUTO: 4.6 % — LOW (ref 13–44)
MCHC RBC-ENTMCNC: 30.2 PG — SIGNIFICANT CHANGE UP (ref 27–34)
MCHC RBC-ENTMCNC: 30.6 PG — SIGNIFICANT CHANGE UP (ref 27–34)
MCHC RBC-ENTMCNC: 31 GM/DL — LOW (ref 32–36)
MCHC RBC-ENTMCNC: 31.3 GM/DL — LOW (ref 32–36)
MCHC RBC-ENTMCNC: 31.5 PG — SIGNIFICANT CHANGE UP (ref 27–34)
MCHC RBC-ENTMCNC: 31.5 PG — SIGNIFICANT CHANGE UP (ref 27–34)
MCHC RBC-ENTMCNC: 32.6 GM/DL — SIGNIFICANT CHANGE UP (ref 32–36)
MCHC RBC-ENTMCNC: 32.8 PG — SIGNIFICANT CHANGE UP (ref 27–34)
MCHC RBC-ENTMCNC: 32.9 PG — SIGNIFICANT CHANGE UP (ref 27–34)
MCHC RBC-ENTMCNC: 33 GM/DL — SIGNIFICANT CHANGE UP (ref 32–36)
MCHC RBC-ENTMCNC: 33.1 GM/DL — SIGNIFICANT CHANGE UP (ref 32–36)
MCHC RBC-ENTMCNC: 33.1 PG — SIGNIFICANT CHANGE UP (ref 27–34)
MCHC RBC-ENTMCNC: 33.3 GM/DL — SIGNIFICANT CHANGE UP (ref 32–36)
MCHC RBC-ENTMCNC: 33.3 PG — SIGNIFICANT CHANGE UP (ref 27–34)
MCHC RBC-ENTMCNC: 33.5 PG — SIGNIFICANT CHANGE UP (ref 27–34)
MCHC RBC-ENTMCNC: 33.6 GM/DL — SIGNIFICANT CHANGE UP (ref 32–36)
MCV RBC AUTO: 100 FL — SIGNIFICANT CHANGE UP (ref 80–100)
MCV RBC AUTO: 100.2 FL — HIGH (ref 80–100)
MCV RBC AUTO: 100.7 FL — HIGH (ref 80–100)
MCV RBC AUTO: 93.8 FL — SIGNIFICANT CHANGE UP (ref 80–100)
MCV RBC AUTO: 95.2 FL — SIGNIFICANT CHANGE UP (ref 80–100)
MCV RBC AUTO: 96.7 FL — SIGNIFICANT CHANGE UP (ref 80–100)
MCV RBC AUTO: 97.8 FL — SIGNIFICANT CHANGE UP (ref 80–100)
MCV RBC AUTO: 98.8 FL — SIGNIFICANT CHANGE UP (ref 80–100)
MCV RBC AUTO: 99.6 FL — SIGNIFICANT CHANGE UP (ref 80–100)
MONOCYTES # BLD AUTO: 0.56 K/UL — SIGNIFICANT CHANGE UP (ref 0–0.9)
MONOCYTES # BLD AUTO: 1.23 K/UL — HIGH (ref 0–0.9)
MONOCYTES NFR BLD AUTO: 6.1 % — SIGNIFICANT CHANGE UP (ref 2–14)
MONOCYTES NFR BLD AUTO: 7.6 % — SIGNIFICANT CHANGE UP (ref 2–14)
NEUTROPHILS # BLD AUTO: 17.82 K/UL — HIGH (ref 1.8–7.4)
NEUTROPHILS # BLD AUTO: 4.58 K/UL — SIGNIFICANT CHANGE UP (ref 1.8–7.4)
NEUTROPHILS NFR BLD AUTO: 62.3 % — SIGNIFICANT CHANGE UP (ref 43–77)
NEUTROPHILS NFR BLD AUTO: 88.7 % — HIGH (ref 43–77)
NITRITE UR-MCNC: NEGATIVE — SIGNIFICANT CHANGE UP
NRBC # BLD: 0 /100 WBCS — SIGNIFICANT CHANGE UP (ref 0–0)
PH UR: 6.5 — SIGNIFICANT CHANGE UP (ref 5–8)
PLATELET # BLD AUTO: 143 K/UL — LOW (ref 150–400)
PLATELET # BLD AUTO: 151 K/UL — SIGNIFICANT CHANGE UP (ref 150–400)
PLATELET # BLD AUTO: 153 K/UL — SIGNIFICANT CHANGE UP (ref 150–400)
PLATELET # BLD AUTO: 182 K/UL — SIGNIFICANT CHANGE UP (ref 150–400)
PLATELET # BLD AUTO: 205 K/UL — SIGNIFICANT CHANGE UP (ref 150–400)
PLATELET # BLD AUTO: 265 K/UL — SIGNIFICANT CHANGE UP (ref 150–400)
PLATELET # BLD AUTO: 265 K/UL — SIGNIFICANT CHANGE UP (ref 150–400)
PLATELET # BLD AUTO: 290 K/UL — SIGNIFICANT CHANGE UP (ref 150–400)
PLATELET # BLD AUTO: 328 K/UL — SIGNIFICANT CHANGE UP (ref 150–400)
POTASSIUM SERPL-MCNC: 3.6 MMOL/L — SIGNIFICANT CHANGE UP (ref 3.5–5.3)
POTASSIUM SERPL-MCNC: 3.8 MMOL/L — SIGNIFICANT CHANGE UP (ref 3.5–5.3)
POTASSIUM SERPL-MCNC: 4 MMOL/L — SIGNIFICANT CHANGE UP (ref 3.5–5.3)
POTASSIUM SERPL-MCNC: 4.2 MMOL/L — SIGNIFICANT CHANGE UP (ref 3.5–5.3)
POTASSIUM SERPL-MCNC: 4.2 MMOL/L — SIGNIFICANT CHANGE UP (ref 3.5–5.3)
POTASSIUM SERPL-MCNC: 4.4 MMOL/L — SIGNIFICANT CHANGE UP (ref 3.5–5.3)
POTASSIUM SERPL-SCNC: 3.6 MMOL/L — SIGNIFICANT CHANGE UP (ref 3.5–5.3)
POTASSIUM SERPL-SCNC: 3.8 MMOL/L — SIGNIFICANT CHANGE UP (ref 3.5–5.3)
POTASSIUM SERPL-SCNC: 4 MMOL/L — SIGNIFICANT CHANGE UP (ref 3.5–5.3)
POTASSIUM SERPL-SCNC: 4.2 MMOL/L — SIGNIFICANT CHANGE UP (ref 3.5–5.3)
POTASSIUM SERPL-SCNC: 4.2 MMOL/L — SIGNIFICANT CHANGE UP (ref 3.5–5.3)
POTASSIUM SERPL-SCNC: 4.4 MMOL/L — SIGNIFICANT CHANGE UP (ref 3.5–5.3)
PROT SERPL-MCNC: 7.1 G/DL — SIGNIFICANT CHANGE UP (ref 6–8.3)
PROT SERPL-MCNC: 7.4 G/DL — SIGNIFICANT CHANGE UP (ref 6–8.3)
PROT UR-MCNC: ABNORMAL
PROTHROM AB SERPL-ACNC: 11.6 SEC — SIGNIFICANT CHANGE UP (ref 10–12.9)
PROTHROM AB SERPL-ACNC: 12.2 SEC — SIGNIFICANT CHANGE UP (ref 10–12.9)
RBC # BLD: 2.16 M/UL — LOW (ref 3.8–5.2)
RBC # BLD: 2.57 M/UL — LOW (ref 3.8–5.2)
RBC # BLD: 2.68 M/UL — LOW (ref 3.8–5.2)
RBC # BLD: 2.77 M/UL — LOW (ref 3.8–5.2)
RBC # BLD: 3.11 M/UL — LOW (ref 3.8–5.2)
RBC # BLD: 3.3 M/UL — LOW (ref 3.8–5.2)
RBC # BLD: 3.31 M/UL — LOW (ref 3.8–5.2)
RBC # BLD: 3.87 M/UL — SIGNIFICANT CHANGE UP (ref 3.8–5.2)
RBC # BLD: 4.11 M/UL — SIGNIFICANT CHANGE UP (ref 3.8–5.2)
RBC # FLD: 11.9 % — SIGNIFICANT CHANGE UP (ref 10.3–14.5)
RBC # FLD: 11.9 % — SIGNIFICANT CHANGE UP (ref 10.3–14.5)
RBC # FLD: 12.1 % — SIGNIFICANT CHANGE UP (ref 10.3–14.5)
RBC # FLD: 13.2 % — SIGNIFICANT CHANGE UP (ref 10.3–14.5)
RBC # FLD: 13.2 % — SIGNIFICANT CHANGE UP (ref 10.3–14.5)
RBC # FLD: 13.3 % — SIGNIFICANT CHANGE UP (ref 10.3–14.5)
RBC # FLD: 13.4 % — SIGNIFICANT CHANGE UP (ref 10.3–14.5)
RBC # FLD: 13.5 % — SIGNIFICANT CHANGE UP (ref 10.3–14.5)
RBC # FLD: 13.5 % — SIGNIFICANT CHANGE UP (ref 10.3–14.5)
RBC CASTS # UR COMP ASSIST: 10 /HPF — HIGH (ref 0–4)
RH IG SCN BLD-IMP: POSITIVE — SIGNIFICANT CHANGE UP
SODIUM SERPL-SCNC: 136 MMOL/L — SIGNIFICANT CHANGE UP (ref 135–145)
SODIUM SERPL-SCNC: 137 MMOL/L — SIGNIFICANT CHANGE UP (ref 135–145)
SODIUM SERPL-SCNC: 138 MMOL/L — SIGNIFICANT CHANGE UP (ref 135–145)
SODIUM SERPL-SCNC: 139 MMOL/L — SIGNIFICANT CHANGE UP (ref 135–145)
SODIUM SERPL-SCNC: 139 MMOL/L — SIGNIFICANT CHANGE UP (ref 135–145)
SODIUM SERPL-SCNC: 140 MMOL/L — SIGNIFICANT CHANGE UP (ref 135–145)
SP GR SPEC: 1.01 — SIGNIFICANT CHANGE UP (ref 1.01–1.02)
SPECIMEN SOURCE: SIGNIFICANT CHANGE UP
TROPONIN T, HIGH SENSITIVITY RESULT: 56 NG/L — HIGH (ref 0–51)
TROPONIN T, HIGH SENSITIVITY RESULT: 61 NG/L — HIGH (ref 0–51)
TROPONIN T, HIGH SENSITIVITY RESULT: 67 NG/L — HIGH (ref 0–51)
TROPONIN T, HIGH SENSITIVITY RESULT: 71 NG/L — HIGH (ref 0–51)
TSH SERPL-MCNC: 0.83 UIU/ML — SIGNIFICANT CHANGE UP (ref 0.27–4.2)
UROBILINOGEN FLD QL: NEGATIVE — SIGNIFICANT CHANGE UP
VIT B12 SERPL-MCNC: 1116 PG/ML — SIGNIFICANT CHANGE UP (ref 232–1245)
WBC # BLD: 10.18 K/UL — SIGNIFICANT CHANGE UP (ref 3.8–10.5)
WBC # BLD: 11.06 K/UL — HIGH (ref 3.8–10.5)
WBC # BLD: 11.3 K/UL — HIGH (ref 3.8–10.5)
WBC # BLD: 11.53 K/UL — HIGH (ref 3.8–10.5)
WBC # BLD: 12.31 K/UL — HIGH (ref 3.8–10.5)
WBC # BLD: 20.11 K/UL — HIGH (ref 3.8–10.5)
WBC # BLD: 7.35 K/UL — SIGNIFICANT CHANGE UP (ref 3.8–10.5)
WBC # BLD: 8.9 K/UL — SIGNIFICANT CHANGE UP (ref 3.8–10.5)
WBC # BLD: 9.7 K/UL — SIGNIFICANT CHANGE UP (ref 3.8–10.5)
WBC # FLD AUTO: 10.18 K/UL — SIGNIFICANT CHANGE UP (ref 3.8–10.5)
WBC # FLD AUTO: 11.06 K/UL — HIGH (ref 3.8–10.5)
WBC # FLD AUTO: 11.3 K/UL — HIGH (ref 3.8–10.5)
WBC # FLD AUTO: 11.53 K/UL — HIGH (ref 3.8–10.5)
WBC # FLD AUTO: 12.31 K/UL — HIGH (ref 3.8–10.5)
WBC # FLD AUTO: 20.11 K/UL — HIGH (ref 3.8–10.5)
WBC # FLD AUTO: 7.35 K/UL — SIGNIFICANT CHANGE UP (ref 3.8–10.5)
WBC # FLD AUTO: 8.9 K/UL — SIGNIFICANT CHANGE UP (ref 3.8–10.5)
WBC # FLD AUTO: 9.7 K/UL — SIGNIFICANT CHANGE UP (ref 3.8–10.5)
WBC UR QL: 1 /HPF — SIGNIFICANT CHANGE UP (ref 0–5)

## 2019-01-01 PROCEDURE — 99223 1ST HOSP IP/OBS HIGH 75: CPT

## 2019-01-01 PROCEDURE — 99233 SBSQ HOSP IP/OBS HIGH 50: CPT | Mod: GC

## 2019-01-01 PROCEDURE — 70486 CT MAXILLOFACIAL W/O DYE: CPT

## 2019-01-01 PROCEDURE — 93010 ELECTROCARDIOGRAM REPORT: CPT

## 2019-01-01 PROCEDURE — 86900 BLOOD TYPING SEROLOGIC ABO: CPT

## 2019-01-01 PROCEDURE — 72170 X-RAY EXAM OF PELVIS: CPT | Mod: 26

## 2019-01-01 PROCEDURE — 99285 EMERGENCY DEPT VISIT HI MDM: CPT | Mod: GC

## 2019-01-01 PROCEDURE — 99239 HOSP IP/OBS DSCHRG MGMT >30: CPT

## 2019-01-01 PROCEDURE — 84484 ASSAY OF TROPONIN QUANT: CPT

## 2019-01-01 PROCEDURE — 85610 PROTHROMBIN TIME: CPT

## 2019-01-01 PROCEDURE — 97116 GAIT TRAINING THERAPY: CPT

## 2019-01-01 PROCEDURE — 86850 RBC ANTIBODY SCREEN: CPT

## 2019-01-01 PROCEDURE — 70450 CT HEAD/BRAIN W/O DYE: CPT | Mod: 26

## 2019-01-01 PROCEDURE — 73552 X-RAY EXAM OF FEMUR 2/>: CPT

## 2019-01-01 PROCEDURE — 97162 PT EVAL MOD COMPLEX 30 MIN: CPT

## 2019-01-01 PROCEDURE — 86901 BLOOD TYPING SEROLOGIC RH(D): CPT

## 2019-01-01 PROCEDURE — 85730 THROMBOPLASTIN TIME PARTIAL: CPT

## 2019-01-01 PROCEDURE — 80048 BASIC METABOLIC PNL TOTAL CA: CPT

## 2019-01-01 PROCEDURE — 93005 ELECTROCARDIOGRAM TRACING: CPT

## 2019-01-01 PROCEDURE — 82550 ASSAY OF CK (CPK): CPT

## 2019-01-01 PROCEDURE — C1713: CPT

## 2019-01-01 PROCEDURE — 72125 CT NECK SPINE W/O DYE: CPT | Mod: 26

## 2019-01-01 PROCEDURE — 97161 PT EVAL LOW COMPLEX 20 MIN: CPT

## 2019-01-01 PROCEDURE — 99285 EMERGENCY DEPT VISIT HI MDM: CPT

## 2019-01-01 PROCEDURE — 93306 TTE W/DOPPLER COMPLETE: CPT

## 2019-01-01 PROCEDURE — 86140 C-REACTIVE PROTEIN: CPT

## 2019-01-01 PROCEDURE — 36000 PLACE NEEDLE IN VEIN: CPT

## 2019-01-01 PROCEDURE — 76377 3D RENDER W/INTRP POSTPROCES: CPT

## 2019-01-01 PROCEDURE — 85027 COMPLETE CBC AUTOMATED: CPT

## 2019-01-01 PROCEDURE — 93010 ELECTROCARDIOGRAM REPORT: CPT | Mod: 76

## 2019-01-01 PROCEDURE — 27245 TREAT THIGH FRACTURE: CPT | Mod: AS,RT

## 2019-01-01 PROCEDURE — 73502 X-RAY EXAM HIP UNI 2-3 VIEWS: CPT

## 2019-01-01 PROCEDURE — 76000 FLUOROSCOPY <1 HR PHYS/QHP: CPT

## 2019-01-01 PROCEDURE — 70486 CT MAXILLOFACIAL W/O DYE: CPT | Mod: 26

## 2019-01-01 PROCEDURE — P9016: CPT

## 2019-01-01 PROCEDURE — 27245 TREAT THIGH FRACTURE: CPT | Mod: RT

## 2019-01-01 PROCEDURE — 80053 COMPREHEN METABOLIC PANEL: CPT

## 2019-01-01 PROCEDURE — 99497 ADVNCD CARE PLAN 30 MIN: CPT | Mod: 25

## 2019-01-01 PROCEDURE — 99223 1ST HOSP IP/OBS HIGH 75: CPT | Mod: 57

## 2019-01-01 PROCEDURE — 82607 VITAMIN B-12: CPT

## 2019-01-01 PROCEDURE — 90715 TDAP VACCINE 7 YRS/> IM: CPT

## 2019-01-01 PROCEDURE — 73564 X-RAY EXAM KNEE 4 OR MORE: CPT | Mod: 26,RT

## 2019-01-01 PROCEDURE — 70450 CT HEAD/BRAIN W/O DYE: CPT

## 2019-01-01 PROCEDURE — 36430 TRANSFUSION BLD/BLD COMPNT: CPT

## 2019-01-01 PROCEDURE — 71045 X-RAY EXAM CHEST 1 VIEW: CPT

## 2019-01-01 PROCEDURE — 72125 CT NECK SPINE W/O DYE: CPT

## 2019-01-01 PROCEDURE — 97110 THERAPEUTIC EXERCISES: CPT

## 2019-01-01 PROCEDURE — 81001 URINALYSIS AUTO W/SCOPE: CPT

## 2019-01-01 PROCEDURE — 72170 X-RAY EXAM OF PELVIS: CPT

## 2019-01-01 PROCEDURE — 73564 X-RAY EXAM KNEE 4 OR MORE: CPT

## 2019-01-01 PROCEDURE — 36415 COLL VENOUS BLD VENIPUNCTURE: CPT

## 2019-01-01 PROCEDURE — 86923 COMPATIBILITY TEST ELECTRIC: CPT

## 2019-01-01 PROCEDURE — 85652 RBC SED RATE AUTOMATED: CPT

## 2019-01-01 PROCEDURE — C1769: CPT

## 2019-01-01 PROCEDURE — 84443 ASSAY THYROID STIM HORMONE: CPT

## 2019-01-01 PROCEDURE — 71045 X-RAY EXAM CHEST 1 VIEW: CPT | Mod: 26

## 2019-01-01 PROCEDURE — 73502 X-RAY EXAM HIP UNI 2-3 VIEWS: CPT | Mod: 26,RT

## 2019-01-01 PROCEDURE — C1889: CPT

## 2019-01-01 PROCEDURE — 73552 X-RAY EXAM OF FEMUR 2/>: CPT | Mod: 26,RT

## 2019-01-01 PROCEDURE — 93306 TTE W/DOPPLER COMPLETE: CPT | Mod: 26

## 2019-01-01 PROCEDURE — 99223 1ST HOSP IP/OBS HIGH 75: CPT | Mod: GC

## 2019-01-01 PROCEDURE — 82553 CREATINE MB FRACTION: CPT

## 2019-01-01 PROCEDURE — 87086 URINE CULTURE/COLONY COUNT: CPT

## 2019-01-01 PROCEDURE — 99285 EMERGENCY DEPT VISIT HI MDM: CPT | Mod: 25

## 2019-01-01 PROCEDURE — 76377 3D RENDER W/INTRP POSTPROCES: CPT | Mod: 26

## 2019-01-01 RX ORDER — ENOXAPARIN SODIUM 100 MG/ML
30 INJECTION SUBCUTANEOUS DAILY
Refills: 0 | Status: DISCONTINUED | OUTPATIENT
Start: 2019-01-01 | End: 2019-01-01

## 2019-01-01 RX ORDER — ASPIRIN/CALCIUM CARB/MAGNESIUM 324 MG
81 TABLET ORAL DAILY
Refills: 0 | Status: DISCONTINUED | OUTPATIENT
Start: 2019-01-01 | End: 2019-01-01

## 2019-01-01 RX ORDER — MORPHINE SULFATE 50 MG/1
2 CAPSULE, EXTENDED RELEASE ORAL
Refills: 0 | Status: DISCONTINUED | OUTPATIENT
Start: 2019-01-01 | End: 2019-01-01

## 2019-01-01 RX ORDER — SODIUM CHLORIDE 9 MG/ML
1000 INJECTION, SOLUTION INTRAVENOUS
Refills: 0 | Status: DISCONTINUED | OUTPATIENT
Start: 2019-01-01 | End: 2019-01-01

## 2019-01-01 RX ORDER — ONDANSETRON 8 MG/1
4 TABLET, FILM COATED ORAL EVERY 6 HOURS
Refills: 0 | Status: DISCONTINUED | OUTPATIENT
Start: 2019-01-01 | End: 2019-01-01

## 2019-01-01 RX ORDER — TETANUS TOXOID, REDUCED DIPHTHERIA TOXOID AND ACELLULAR PERTUSSIS VACCINE, ADSORBED 5; 2.5; 8; 8; 2.5 [IU]/.5ML; [IU]/.5ML; UG/.5ML; UG/.5ML; UG/.5ML
0.5 SUSPENSION INTRAMUSCULAR ONCE
Refills: 0 | Status: COMPLETED | OUTPATIENT
Start: 2019-01-01 | End: 2019-01-01

## 2019-01-01 RX ORDER — SENNA PLUS 8.6 MG/1
2 TABLET ORAL
Qty: 60 | Refills: 0
Start: 2019-01-01 | End: 2019-12-19

## 2019-01-01 RX ORDER — PREGABALIN 225 MG/1
1 CAPSULE ORAL
Qty: 30 | Refills: 0
Start: 2019-01-01 | End: 2019-12-19

## 2019-01-01 RX ORDER — CHLORHEXIDINE GLUCONATE 213 G/1000ML
1 SOLUTION TOPICAL ONCE
Refills: 0 | Status: DISCONTINUED | OUTPATIENT
Start: 2019-01-01 | End: 2019-01-01

## 2019-01-01 RX ORDER — ACETAMINOPHEN 500 MG
1000 TABLET ORAL ONCE
Refills: 0 | Status: COMPLETED | OUTPATIENT
Start: 2019-01-01 | End: 2019-01-01

## 2019-01-01 RX ORDER — HYDROMORPHONE HYDROCHLORIDE 2 MG/ML
0.5 INJECTION INTRAMUSCULAR; INTRAVENOUS; SUBCUTANEOUS
Refills: 0 | Status: DISCONTINUED | OUTPATIENT
Start: 2019-01-01 | End: 2019-01-01

## 2019-01-01 RX ORDER — ACETAMINOPHEN 500 MG
975 TABLET ORAL ONCE
Refills: 0 | Status: COMPLETED | OUTPATIENT
Start: 2019-01-01 | End: 2019-01-01

## 2019-01-01 RX ORDER — NYSTATIN CREAM 100000 [USP'U]/G
1 CREAM TOPICAL
Qty: 0 | Refills: 0 | DISCHARGE
Start: 2019-01-01

## 2019-01-01 RX ORDER — DOCUSATE SODIUM 100 MG
1 CAPSULE ORAL
Qty: 90 | Refills: 0
Start: 2019-01-01 | End: 2019-12-19

## 2019-01-01 RX ORDER — SODIUM CHLORIDE 9 MG/ML
1000 INJECTION INTRAMUSCULAR; INTRAVENOUS; SUBCUTANEOUS
Refills: 0 | Status: DISCONTINUED | OUTPATIENT
Start: 2019-01-01 | End: 2019-01-01

## 2019-01-01 RX ORDER — COLCHICINE 0.6 MG
1 TABLET ORAL
Qty: 0 | Refills: 0 | DISCHARGE
Start: 2019-01-01

## 2019-01-01 RX ORDER — DOCUSATE SODIUM 100 MG
100 CAPSULE ORAL THREE TIMES A DAY
Refills: 0 | Status: DISCONTINUED | OUTPATIENT
Start: 2019-01-01 | End: 2019-01-01

## 2019-01-01 RX ORDER — ENOXAPARIN SODIUM 100 MG/ML
40 INJECTION SUBCUTANEOUS
Qty: 0 | Refills: 0 | DISCHARGE
Start: 2019-01-01

## 2019-01-01 RX ORDER — NYSTATIN CREAM 100000 [USP'U]/G
1 CREAM TOPICAL
Refills: 0 | Status: DISCONTINUED | OUTPATIENT
Start: 2019-01-01 | End: 2019-01-01

## 2019-01-01 RX ORDER — SENNA PLUS 8.6 MG/1
2 TABLET ORAL AT BEDTIME
Refills: 0 | Status: DISCONTINUED | OUTPATIENT
Start: 2019-01-01 | End: 2019-01-01

## 2019-01-01 RX ORDER — COLCHICINE 0.6 MG
1 TABLET ORAL
Qty: 30 | Refills: 0
Start: 2019-01-01 | End: 2019-12-19

## 2019-01-01 RX ORDER — ASPIRIN/CALCIUM CARB/MAGNESIUM 324 MG
1 TABLET ORAL
Qty: 0 | Refills: 0 | DISCHARGE

## 2019-01-01 RX ORDER — CHOLECALCIFEROL (VITAMIN D3) 125 MCG
1000 CAPSULE ORAL
Qty: 0 | Refills: 0 | DISCHARGE

## 2019-01-01 RX ORDER — PREGABALIN 225 MG/1
1 CAPSULE ORAL
Qty: 0 | Refills: 0 | DISCHARGE

## 2019-01-01 RX ORDER — OXYCODONE HYDROCHLORIDE 5 MG/1
5 TABLET ORAL
Refills: 0 | Status: DISCONTINUED | OUTPATIENT
Start: 2019-01-01 | End: 2019-01-01

## 2019-01-01 RX ORDER — COLCHICINE 0.6 MG
0.6 TABLET ORAL DAILY
Refills: 0 | Status: DISCONTINUED | OUTPATIENT
Start: 2019-01-01 | End: 2019-01-01

## 2019-01-01 RX ORDER — OXYCODONE HYDROCHLORIDE 5 MG/1
1 TABLET ORAL
Qty: 0 | Refills: 0 | DISCHARGE
Start: 2019-01-01

## 2019-01-01 RX ORDER — PREGABALIN 225 MG/1
1000 CAPSULE ORAL DAILY
Refills: 0 | Status: DISCONTINUED | OUTPATIENT
Start: 2019-01-01 | End: 2019-01-01

## 2019-01-01 RX ORDER — MORPHINE SULFATE 50 MG/1
2 CAPSULE, EXTENDED RELEASE ORAL EVERY 4 HOURS
Refills: 0 | Status: DISCONTINUED | OUTPATIENT
Start: 2019-01-01 | End: 2019-01-01

## 2019-01-01 RX ORDER — MORPHINE SULFATE 50 MG/1
4 CAPSULE, EXTENDED RELEASE ORAL
Refills: 0 | Status: DISCONTINUED | OUTPATIENT
Start: 2019-01-01 | End: 2019-01-01

## 2019-01-01 RX ORDER — ACETAMINOPHEN 500 MG
650 TABLET ORAL EVERY 4 HOURS
Refills: 0 | Status: DISCONTINUED | OUTPATIENT
Start: 2019-01-01 | End: 2019-01-01

## 2019-01-01 RX ORDER — ASPIRIN/CALCIUM CARB/MAGNESIUM 324 MG
1 TABLET ORAL
Qty: 30 | Refills: 0
Start: 2019-01-01 | End: 2019-12-19

## 2019-01-01 RX ADMIN — ENOXAPARIN SODIUM 30 MILLIGRAM(S): 100 INJECTION SUBCUTANEOUS at 11:30

## 2019-01-01 RX ADMIN — PREGABALIN 1000 MICROGRAM(S): 225 CAPSULE ORAL at 11:52

## 2019-01-01 RX ADMIN — Medication 0.6 MILLIGRAM(S): at 12:06

## 2019-01-01 RX ADMIN — SODIUM CHLORIDE 100 MILLILITER(S): 9 INJECTION, SOLUTION INTRAVENOUS at 12:54

## 2019-01-01 RX ADMIN — ENOXAPARIN SODIUM 30 MILLIGRAM(S): 100 INJECTION SUBCUTANEOUS at 13:13

## 2019-01-01 RX ADMIN — Medication 1000 MILLIGRAM(S): at 00:00

## 2019-01-01 RX ADMIN — Medication 0.6 MILLIGRAM(S): at 11:40

## 2019-01-01 RX ADMIN — Medication 100 MILLIGRAM(S): at 21:52

## 2019-01-01 RX ADMIN — Medication 1 TABLET(S): at 23:17

## 2019-01-01 RX ADMIN — Medication 100 MILLIGRAM(S): at 05:20

## 2019-01-01 RX ADMIN — Medication 100 MILLIGRAM(S): at 14:17

## 2019-01-01 RX ADMIN — Medication 1 TABLET(S): at 12:06

## 2019-01-01 RX ADMIN — Medication 1 TABLET(S): at 11:40

## 2019-01-01 RX ADMIN — SENNA PLUS 2 TABLET(S): 8.6 TABLET ORAL at 21:53

## 2019-01-01 RX ADMIN — Medication 81 MILLIGRAM(S): at 08:45

## 2019-01-01 RX ADMIN — Medication 0.6 MILLIGRAM(S): at 12:56

## 2019-01-01 RX ADMIN — Medication 81 MILLIGRAM(S): at 12:53

## 2019-01-01 RX ADMIN — Medication 100 MILLIGRAM(S): at 21:00

## 2019-01-01 RX ADMIN — PREGABALIN 1000 MICROGRAM(S): 225 CAPSULE ORAL at 11:30

## 2019-01-01 RX ADMIN — Medication 1 TABLET(S): at 12:56

## 2019-01-01 RX ADMIN — Medication 0.6 MILLIGRAM(S): at 13:17

## 2019-01-01 RX ADMIN — Medication 1 TABLET(S): at 12:53

## 2019-01-01 RX ADMIN — Medication 100 MILLIGRAM(S): at 05:30

## 2019-01-01 RX ADMIN — ENOXAPARIN SODIUM 30 MILLIGRAM(S): 100 INJECTION SUBCUTANEOUS at 12:54

## 2019-01-01 RX ADMIN — NYSTATIN CREAM 1 APPLICATION(S): 100000 CREAM TOPICAL at 05:20

## 2019-01-01 RX ADMIN — NYSTATIN CREAM 1 APPLICATION(S): 100000 CREAM TOPICAL at 05:59

## 2019-01-01 RX ADMIN — Medication 100 MILLIGRAM(S): at 05:59

## 2019-01-01 RX ADMIN — Medication 0.6 MILLIGRAM(S): at 12:53

## 2019-01-01 RX ADMIN — Medication 100 MILLIGRAM(S): at 08:55

## 2019-01-01 RX ADMIN — Medication 0.6 MILLIGRAM(S): at 08:45

## 2019-01-01 RX ADMIN — Medication 81 MILLIGRAM(S): at 13:13

## 2019-01-01 RX ADMIN — ENOXAPARIN SODIUM 30 MILLIGRAM(S): 100 INJECTION SUBCUTANEOUS at 23:17

## 2019-01-01 RX ADMIN — Medication 81 MILLIGRAM(S): at 12:56

## 2019-01-01 RX ADMIN — Medication 100 MILLIGRAM(S): at 23:41

## 2019-01-01 RX ADMIN — PREGABALIN 1000 MICROGRAM(S): 225 CAPSULE ORAL at 12:53

## 2019-01-01 RX ADMIN — NYSTATIN CREAM 1 APPLICATION(S): 100000 CREAM TOPICAL at 17:57

## 2019-01-01 RX ADMIN — SODIUM CHLORIDE 60 MILLILITER(S): 9 INJECTION INTRAMUSCULAR; INTRAVENOUS; SUBCUTANEOUS at 13:14

## 2019-01-01 RX ADMIN — Medication 400 MILLIGRAM(S): at 23:41

## 2019-01-01 RX ADMIN — ENOXAPARIN SODIUM 30 MILLIGRAM(S): 100 INJECTION SUBCUTANEOUS at 08:45

## 2019-01-01 RX ADMIN — ENOXAPARIN SODIUM 30 MILLIGRAM(S): 100 INJECTION SUBCUTANEOUS at 11:52

## 2019-01-01 RX ADMIN — TETANUS TOXOID, REDUCED DIPHTHERIA TOXOID AND ACELLULAR PERTUSSIS VACCINE, ADSORBED 0.5 MILLILITER(S): 5; 2.5; 8; 8; 2.5 SUSPENSION INTRAMUSCULAR at 12:49

## 2019-01-01 RX ADMIN — Medication 975 MILLIGRAM(S): at 11:08

## 2019-01-01 RX ADMIN — Medication 1 TABLET(S): at 08:45

## 2019-01-01 RX ADMIN — Medication 975 MILLIGRAM(S): at 12:00

## 2019-01-01 RX ADMIN — SODIUM CHLORIDE 100 MILLILITER(S): 9 INJECTION, SOLUTION INTRAVENOUS at 21:55

## 2019-01-01 RX ADMIN — ENOXAPARIN SODIUM 30 MILLIGRAM(S): 100 INJECTION SUBCUTANEOUS at 12:06

## 2019-01-01 RX ADMIN — OXYCODONE HYDROCHLORIDE 5 MILLIGRAM(S): 5 TABLET ORAL at 05:22

## 2019-01-01 RX ADMIN — ENOXAPARIN SODIUM 30 MILLIGRAM(S): 100 INJECTION SUBCUTANEOUS at 12:56

## 2019-01-01 RX ADMIN — OXYCODONE HYDROCHLORIDE 5 MILLIGRAM(S): 5 TABLET ORAL at 10:15

## 2019-01-01 RX ADMIN — OXYCODONE HYDROCHLORIDE 5 MILLIGRAM(S): 5 TABLET ORAL at 09:15

## 2019-01-01 RX ADMIN — SENNA PLUS 2 TABLET(S): 8.6 TABLET ORAL at 21:00

## 2019-01-01 RX ADMIN — Medication 1 TABLET(S): at 13:20

## 2019-01-01 RX ADMIN — ENOXAPARIN SODIUM 30 MILLIGRAM(S): 100 INJECTION SUBCUTANEOUS at 11:40

## 2019-01-01 RX ADMIN — Medication 81 MILLIGRAM(S): at 23:16

## 2019-01-01 RX ADMIN — Medication 81 MILLIGRAM(S): at 11:40

## 2019-01-01 RX ADMIN — Medication 100 MILLIGRAM(S): at 12:53

## 2019-01-01 RX ADMIN — Medication 81 MILLIGRAM(S): at 12:06

## 2019-01-01 RX ADMIN — ENOXAPARIN SODIUM 30 MILLIGRAM(S): 100 INJECTION SUBCUTANEOUS at 12:53

## 2019-05-29 NOTE — PROGRESS NOTE ADULT - PROBLEM SELECTOR PROBLEM 2
"5/29/2019       RE: Scott Mirza  [address]  [facility] [place] [id number]     Dear Colleague,    Thank you for referring your patient, Scott Mirza, to the [place] at [facility]. Please see a copy of my visit note below.    Endocrinology and Diabetes Clinic New Visit  Date of Service: May 29, 2019    Reason for consult: hyperthyroidism    HPI:   Scott Mirza is a 71 year old male with a PMH including DM 2, CAD status post CABG 2016, multiple arrhythmias and eventual pacemaker placement 1/30/2019, and hypertension who presents for evaluation of hyperthyroidism.     He has been on amiodarone and thyroid function tests were checked on 5/15/2019 at cardiology/EP office visit.  TSH was fully suppressed and free T4 was elevated into the 4s.  Cardiology stopped amiodarone at that time without any noted plan to restart.    Today the patient reports that he is currently feeling better now post RA lead revision 5/22/19. Still has shortness of breath with activity but less now than prior to lead revision. No chest pain with activity. Is scheduled to get NM stress test tomorrow.     Had stopped the amiodarone on 5/15/19 (day of the cardiology office visit), had been on 200mg per day.     Had used the amiodarone in 2016 as well for 3-4 months but then amiodarone was stopped by a physician. Restarted the amiodarone in January 2019 and continued until 5/22/19.    He does not endorse ever having had any thyroid problems in the past. Has never been on thyroid meds. No anterior neck radiation or surgery.     ENDO THYROID LABS-P Latest Ref Rng & Units 5/15/2019 1/25/2019   TSH 0.40 - 4.00 mU/L <0.01 (L) 0.38 (L)   T4 FREE 0.76 - 1.46 ng/dL 4.02 (H) 1.26         He reports that he has had diarrhea constantly after he was in the hospital in January 2019, saw doctor for it about a month later. Was having BMs 3-4x per day, always after ate, loose?watery. They checked for \"everything\" with " "stool sample so okayed immodium. Took immodium for about a week at that time. If doesn't take immodium every other day since then-he is \"unsure what would happen\", when has stomach cramps and has to go, stool is semi loose. will take an immoidum. No change in past week or two.     Has lost about 34lbs since Jan, has now gained back about 5bls since Wednesday.       Symptoms:  Palpitations: has never had palpitations  Tremor: no  Diaphoresis: no  Heat intolerance: no  Hyperdefecation: see above  Weight change: see above  Appetite: no change  New anxiety: no  New insomnia: no  Hair/skin changes: no  Edema: None new since 2016  Energy level/fatigue: no change lately    Goiter/lumps or bumps/neck pain: no    Dysphagia/odynophagia: has had some difficulty swealling since bypass, feels like something gets hung up sometimes    Changes in how eye appear, diplopia, eye dryness or grittiness: no    Recent cold/flu/infecious cough/sore throat: no    Recent contrast: most recent 1/25/19, CT PA    Supplements/biotin:   None     Family hx:   No history of thyroid problems that he is aware of  (His significant other has Graves' disease so they are somewhat familiar with the concept of hyperthyroidism)      Review of Systems:  Complete ROS obtained, see pertinent positives and negatives above    Current Problem List:   Patient Active Problem List   Diagnosis     NSTEMI (non-ST elevated myocardial infarction) (H)     S/P CABG x 5     Typical atrial flutter (H)     Paroxysmal atrial fibrillation (H)     CAD, multiple vessel     Tachycardia     S/P placement of cardiac pacemaker     Pacemaker     Pacemaker lead malfunction       Past Medical and Past Surgical History:  Past Medical History:   Diagnosis Date     CAD, multiple vessel      Diabetes (H)     2006. Insulin 10/16     Family history of premature CAD      HLD (hyperlipidemia)      HTN (hypertension)      NSTEMI (non-ST elevated myocardial infarction) (H)      Paroxysmal atrial " "fibrillation (H)     S/P cardioversion on 10/19/16     Squamous cell cancer of skin of nose      Tobacco abuse      Typical atrial flutter (H)        Past Surgical History:   Procedure Laterality Date     ANESTHESIA CARDIOVERSION N/A 11/13/2016    Procedure: ANESTHESIA CARDIOVERSION;  Surgeon: GENERIC ANESTHESIA PROVIDER;  Location: [facility] OR     ANESTHESIA CARDIOVERSION N/A 4/9/2019    Procedure: CARDIOVERSION;  Surgeon: GENERIC ANESTHESIA PROVIDER;  Location: [facility] OR     BYPASS GRAFT ARTERY CORONARY N/A 11/7/2016    Procedure: BYPASS GRAFT ARTERY CORONARY;  Surgeon: Janusz Whitt MD;  Location: [facility] OR     EP LEAD REVISION DUAL N/A 4/9/2019    Procedure: EP LEAD REVISION DUAL;  Surgeon: Gilberto Villagran MD;  Location:  HEART CARDIAC CATH LAB     EP LEAD REVISION DUAL N/A 5/22/2019    Procedure: EP LEAD REVISION DUAL;  Surgeon: Galdino Lopez MD;  Location:  HEART CARDIAC CATH LAB     EP PACEMAKER N/A 1/29/2019    Procedure: EP Pacemaker;  Surgeon: Arnulfo Copeland MD;  Location:  HEART CARDIAC CATH LAB     H ABLATION ATRIAL FLUTTER  07/10/2017     H DEVICE LEFT ATRIAL APPENDAGE W/IMPLANT CR49 N/A 11/07/2016    RANJIT clip with CABG: \"[employer]\"      HEART CATH CORONARY ANGIOGRAM WITHOUT PRESSURES  10/21/16     HERNIA REPAIR      Umbilical 1993     Hernia Surgery       SPINE SURGERY      C5-C7       Medications:   Current Outpatient Medications   Medication Sig Dispense Refill     Acetaminophen (PAIN RELIEF PO) Take 500 mg by mouth       acetaminophen-codeine (TYLENOL W/CODEINE NO. 3) 300-30 MG per tablet Take 1-2 tablets by mouth every 4 hours as needed for severe pain (mild or moderate pain) 14 tablet 0     apixaban ANTICOAGULANT (ELIQUIS) 5 MG tablet Take 1 tablet (5 mg) by mouth 2 times daily 60 tablet 0     atorvastatin (LIPITOR) 80 MG tablet Take 40 mg by mouth daily       carvedilol (COREG) 12.5 MG tablet Take 1 tablet (12.5 mg) by mouth 2 times daily (with meals) 60 tablet 11     Glucose Blood (BLOOD " "GLUCOSE TEST STRIPS) STRP by In Vitro route 2 times daily Reported on 2017       insulin glargine (LANTUS SOLOSTAR PEN) 100 UNIT/ML pen Inject 50 Units Subcutaneous every morning       lisinopril (PRINIVIL/ZESTRIL) 40 MG tablet Take 1 tablet (40 mg) by mouth daily 90 tablet 0     torsemide (DEMADEX) 20 MG tablet Take 1 tab in the AM and 1/2 tab in the afternoon. (Patient taking differently: Take 20 mg by mouth daily in the afternoon.) 135 tablet 3       Allergies:   Allergies   Allergen Reactions     Simvastatin GI Disturbance       Social History:  Social History     Tobacco Use     Smoking status: Former Smoker     Packs/day: 1.50     Last attempt to quit: 10/16/2016     Years since quittin.6     Smokeless tobacco: Never Used     Tobacco comment: Nicotine path the last 2 weeks as of 10/16   Substance Use Topics     Alcohol use: No     Alcohol/week: 0.0 oz         Family History:  Family History   Problem Relation Age of Onset     Other Cancer Father      Other Cancer Brother      Coronary Artery Disease Mother        Physical Examination:  Blood pressure 170/90, pulse 94, height 1.778 m (5' 10\"), weight 112.3 kg (247 lb 9.6 oz).  Body mass index is 35.53 kg/m .  Wt Readings from Last 4 Encounters:   19 112.3 kg (247 lb 9.6 oz)   19 108.9 kg (240 lb)   05/15/19 112.2 kg (247 lb 4.8 oz)   19 120.8 kg (266 lb 4.8 oz)       Gen: No acute distress, comfortable appearing   HEENT: No noted icterus, MMM.  No lid lag or proptosis  Neck/thyroid: Possible right thyroid lobe nodularity, overall no significant goiter although plan is generous in size for age  CV: Regular rate and rhythm.   Pulm: Breathing comfortably on room air. No discrete adventitious sounds identified.  Ext: Bilateral lower extremity edema present  Integumentary: No rashes or other lesions identified   Neuro: No focal deficits noted, muscle bulk appears intact.  Possibly mildly hyperreflexic  Psych: Mood and affect congruent "       Labs and Studies:     See above    Assessment/Plan:    Thyrotoxicosis  Given the clinical scenario suspect that thyrotoxicosis is related to recent amiodarone use, type I versus type II.  Given the timing of onset with additional iodine exposure no bolus is a CT study in January as well suspect that type I may be more likely.  Lower suspicion for underlying Graves' in the setting of age, sex, and lack of family history and suspect underlying multinodular goiter may be more likely.  At this time suspect that given amiodarone was only very recently stopped and given the very long half-life of amiodarone recent iodine loads  Have been too elevated to make a radioactive iodine uptake and scan reliably useful.  We will obtain a urinary iodine at this time as well as a thyroid ultrasound including Doppler flow.   We will also assess for thyroid function test trend today and assess TSI and TPO antibody levels.   He is currently clinically  mildly hyperthyroid.  He continues on carvedilol as per cardiology in the setting of his cardiovascular disease.  He reports that his hypertension is an ongoing problem for which he is working with his other physicians.  -Assess total T3, free T4, TSH today for trend  -Assess TSI, TPO levels  -IL6  -Urinary iodine  -Thyroid ultrasound including Doppler flow  -Assess current baseline CBC with differential as he may need antithyroid medication  -We will follow-up the results of the above studies before making a recommendation.  If results are more consistent with type I hyperthyroidism and he is still significantly thyrotoxic we would likely recommend antithyroid medication use  -Discussed the potential of deferring the nuclear medicine scan that he has scheduled for tomorrow in the setting of current thyrotoxicosis and improving symptoms with cardiology (they would like to defer this study and their schedulers will call the patient)    Plan for in person clinic follow-up in  Stage 3 chronic kidney disease approximately 6 weeks    Orders Placed This Encounter   Procedures     US Thyroid     TSH     T4 free     T3 total     Thyroid stimulating immunoglobulin     Thyroid peroxidase antibody     CBC with platelets differential     Interleukin 6 Blood     urine spot iodine: Laboratory Miscellaneous Order       Patient was seen and examined with attending physician, Dr. Kameron Narayanan MD  Endocrine Fellow       ATTENDING NOTE  I have seen and examined the patient, reviewed and edited the fellow's note, and agree with the plan of care.    Francesca Melo MD PhD    Division of Endocrinology and Diabetes

## 2019-08-03 NOTE — ED ADULT NURSE NOTE - INTERVENTIONS DEFINITIONS
Physically safe environment: no spills, clutter or unnecessary equipment/[place] to call system/Call bell, personal items and telephone within reach/Instruct patient to call for assistance/Stretcher in lowest position, wheels locked, appropriate side rails in place

## 2019-08-03 NOTE — ED PROVIDER NOTE - ATTENDING CONTRIBUTION TO CARE
Dr. Palomino: I performed a face to face bedside interview with patient regarding history of present illness, review of symptoms and past medical history. I completed an independent physical exam.  I have discussed patient's plan of care with PA.   I agree with note as stated above, having amended the EMR as needed to reflect my findings.   This includes HISTORY OF PRESENT ILLNESS, HIV, PAST MEDICAL/SURGICAL/FAMILY/SOCIAL HISTORY, ALLERGIES AND HOME MEDICATIONS, REVIEW OF SYSTEMS, PHYSICAL EXAM, and any PROGRESS NOTES during the time I functioned as the attending physician for this patient.    dr palomino: 89yo F with PMH dementia, HTN, COPD, L hip fx (2018) BIBEMS from NH c/o witnessed trip and fall this morning c/o R hip and R knee pain. Pt with shortened and externally rotated RLE, pain with ROM, neurovascularly intact. Concern for fracture. Will obtain admission/preop labs, EKG, xrays RLE, pain control, and admission.

## 2019-08-03 NOTE — H&P ADULT - HISTORY OF PRESENT ILLNESS
This is a 89F with dementia who is s/p fall at [place] today, witness - she was using her walker and her right leg gave out - found to have a right hip fracture.    Patient is a poor historian. As such, unable to obtain history from patient. However, she does say she had right hip pain, 10/10, sharp, after the fall, worse with movement.

## 2019-08-03 NOTE — H&P ADULT - NSICDXPASTSURGICALHX_GEN_ALL_CORE_FT
PAST SURGICAL HISTORY:  History of      History of repair of left hip joint     History of thyroidectomy

## 2019-08-03 NOTE — ED PROVIDER NOTE - EXTREMITY EXAM
RLE: shortened and externally rotated, decreased ROM of hip and knee 2/2 pain, ankle/foot ROM intact without pain, sensation intact, DP pulse 2+, cap refill <3s; MSK exam otherwise unremarkable

## 2019-08-03 NOTE — ED ADULT NURSE NOTE - OBJECTIVE STATEMENT
Bilirubin result.
As per medic, pt experienced a witness slip and fall, no LOC. Pt denies head trauma, reports slip and fall due to footwear. Presents complaining of right knee and right hip pain. External rotation of right leg on assessment. Pedal pulses present bilaterally. Skin tear to top of left hand, and skin tear to right elbow; bleeding controlled.

## 2019-08-03 NOTE — ED PROVIDER NOTE - OBJECTIVE STATEMENT
89yo F with PMH dementia, HTN, COPD, L hip fx 89yo F with PMH dementia, HTN, COPD, L hip fx (2018) BIBEMS from NH c/o witnessed trip and fall this morning c/o R hip and R knee pain. Pt reports she tripped while wearing her crocs, landing on R side. No head injury, no LOC. Pt unable to ambulate since fall. Pt c/o R hip and knee pain worse with movement. Also c/o abrasion to L wrist. Denies any dizziness/CP/palpitations prior to fall or now. Denies any n/v, numbness/tingling, blood thinner use. 89yo F with PMH dementia, HTN, COPD, L hip fx (2018) BIBEMS from NH c/o witnessed trip and fall this morning c/o R hip and R knee pain. Pt reports she tripped while wearing her crocs, landing on R side. No head injury, no LOC. Pt unable to ambulate since fall. Pt c/o R hip and knee pain worse with movement. Pt comfortable at rest. Also c/o abrasion to L wrist. Denies any dizziness/CP/palpitations prior to fall or now. Denies any n/v, numbness/tingling, blood thinner use.

## 2019-08-03 NOTE — BRIEF OPERATIVE NOTE - NSICDXBRIEFPROCEDURE_GEN_ALL_CORE_FT
PROCEDURES:  Open reduction and internal fixation (ORIF) of right hip with intramedullary nail 03-Aug-2019 17:43:13  Tracy Gallagher

## 2019-08-03 NOTE — GOALS OF CARE CONVERSATION - PERSONAL ADVANCE DIRECTIVE - CONVERSATION DETAILS
HCP, Vamsi, who is the patient's son, states that the patient is DNR/DNI, No feeding tube. Antibiotics and IVF are okay. Son filled out MOLST in past but did not come with patient's records. A new MOLST was filled out and placed in chart. He is okay to rescind the DNR/DNI for surgery. I personally spent  16 minutes face-to-face with patient's HCP discussing advance care planning and code status. MOLST form filled out and placed in bedside chart as described above.

## 2019-08-03 NOTE — H&P ADULT - NSHPLABSRESULTS_GEN_ALL_CORE
.                            13.6   7.35  )-----------( 205      ( 03 Aug 2019 10:20 )             41.2       08-03    140  |  105  |  15  ----------------------------<  112  4.2   |  24  |  1.04    Ca    9.0      03 Aug 2019 10:20    TPro  7.4  /  Alb  3.5  /  TBili  0.4  /  DBili  x   /  AST  25  /  ALT  7   /  AlkPhos  56  08-03    PT/INR - ( 03 Aug 2019 10:20 )   PT: 11.6 sec;   INR: 1.03 ratio         PTT - ( 03 Aug 2019 10:20 )  PTT:27.9 sec            < from: Xray Hip w/ Pelvis 2 or 3 Views, Right (08.03.19 @ 11:18) >    IMPRESSION:    Intratrochanteric fracture of the right hip with avulsion   of the lesser trochanter.    < end of copied text >    < from: Xray Knee w/Patella 4 View, Right (08.03.19 @ 11:16) >        IMPRESSION:   Unremarkable knee radiographs.        < end of copied text > .                            13.6   7.35  )-----------( 205      ( 03 Aug 2019 10:20 )             41.2       08-03    140  |  105  |  15  ----------------------------<  112  4.2   |  24  |  1.04    Ca    9.0      03 Aug 2019 10:20    TPro  7.4  /  Alb  3.5  /  TBili  0.4  /  DBili  x   /  AST  25  /  ALT  7   /  AlkPhos  56  08-03    PT/INR - ( 03 Aug 2019 10:20 )   PT: 11.6 sec;   INR: 1.03 ratio         PTT - ( 03 Aug 2019 10:20 )  PTT:27.9 sec            < from: Xray Hip w/ Pelvis 2 or 3 Views, Right (08.03.19 @ 11:18) >    IMPRESSION:    Intratrochanteric fracture of the right hip with avulsion   of the lesser trochanter.    < end of copied text >    < from: Xray Knee w/Patella 4 View, Right (08.03.19 @ 11:16) >        IMPRESSION:   Unremarkable knee radiographs.        < end of copied text >    EKG: Ordered and pending    Case d/w Dr. Nava at length

## 2019-08-03 NOTE — ED PROVIDER NOTE - CLINICAL SUMMARY MEDICAL DECISION MAKING FREE TEXT BOX
KYAW Villalba: 89yo F with PMH dementia, HTN, COPD, L hip fx (2018) BIBEMS from NH c/o witnessed trip and fall this morning c/o R hip and R knee pain. Pt with shortened and externally rotated RLE, pain with ROM, neurovascularly intact. Concern for fracture. Will obtain admission/preop labs, EKG, xrays RLE, pain control, and admission.

## 2019-08-03 NOTE — ED PROVIDER NOTE - PROGRESS NOTE DETAILS
left hip surgery by dr maria, paged left hip surgery by dr maria, paged several times, no answer  will page md on call christelle maria, dr lerner to see pt   dr doll aware

## 2019-08-03 NOTE — H&P ADULT - ASSESSMENT
89F with dementia who is s/p fall and now has a right hip fracture    #Acute right hip fracture s/p mechanical fall  -NPO  -hold ASA  -Ortho evel  -Will need surgical intervention  -Family  (mariah, son, HCP) - okay with surgical intervention    #Right hip pain  -Pain control, secondary to #1 above    #Dementia, early onset, without behavioral disturbance   -Supportive care    #DVT ppx: High risk, but will hold pending surgical eval    Advanced Care Planning: Patient is DNR/DNI. Jeb will fax MOLST to me. Family will rescind for surgery. 89F with dementia who is s/p fall and now has a right hip fracture    #Acute right hip fracture s/p mechanical fall  -NPO  -hold ASA  -Ortho evel  -Will need surgical intervention  -Family  (mariah, son, HCP) - okay with surgical intervention    #Right hip pain  -Pain control, secondary to #1 above    #Dementia, early onset, without behavioral disturbance   -Supportive care    #DVT ppx: High risk, but will hold pending surgical eval    IMPROVE VTE Individual Risk Assessment    CAPRINI SCORE [CLOT]    AGE RELATED RISK FACTORS                                                       MOBILITY RELATED FACTORS  [ ] Age 41-60 years                                            (1 Point)                  [ ] Bed rest                                                        (1 Point)  [ ] Age: 61-74 years                                           (2 Points)                 [ ] Plaster cast                                                   (2 Points)  [x ] Age= 75 years                                              (3 Points)                 [ ] Bed bound for more than 72 hours                 (2 Points)    DISEASE RELATED RISK FACTORS                                               GENDER SPECIFIC FACTORS  [ ] Edema in the lower extremities                       (1 Point)                  [ ] Pregnancy                                                     (1 Point)  [ ] Varicose veins                                               (1 Point)                  [ ] Post-partum < 6 weeks                                   (1 Point)             [ ] BMI > 25 Kg/m2                                            (1 Point)                  [ ] Hormonal therapy  or oral contraception          (1 Point)                 [ ] Sepsis (in the previous month)                        (1 Point)                  [ ] History of pregnancy complications                 (1 point)  [ ] Pneumonia or serious lung disease                                               [ ] Unexplained or recurrent                     (1 Point)           (in the previous month)                               (1 Point)  [ ] Abnormal pulmonary function test                     (1 Point)                 SURGERY RELATED RISK FACTORS  [ ] Acute myocardial infarction                              (1 Point)                 [ ]  Section                                             (1 Point)  [ ] Congestive heart failure (in the previous month)  (1 Point)               [ ] Minor surgery                                                  (1 Point)   [ ] Inflammatory bowel disease                             (1 Point)                 [ ] Arthroscopic surgery                                        (2 Points)  [ ] Central venous access                                      (2 Points)                [ ] General surgery lasting more than 45 minutes   (2 Points)       [ ] Stroke (in the previous month)                          (5 Points)               [ ] Elective arthroplasty                                         (5 Points)                                                                                                                                               HEMATOLOGY RELATED FACTORS                                                 TRAUMA RELATED RISK FACTORS  [ ] Prior episodes of VTE                                     (3 Points)                [x ] Fracture of the hip, pelvis, or leg                       (5 Points)  [ ] Positive family history for VTE                         (3 Points)                 [ ] Acute spinal cord injury (in the previous month)  (5 Points)  [ ] Prothrombin 63823 A                                     (3 Points)                 [ ] Paralysis  (less than 1 month)                             (5 Points)  [ ] Factor V Leiden                                             (3 Points)                  [ ] Multiple Trauma within 1 month                        (5 Points)  [ ] Lupus anticoagulants                                     (3 Points)                                                           [ ] Anticardiolipin antibodies                               (3 Points)                                                       [ ] High homocysteine in the blood                      (3 Points)                                             [ ] Other congenital or acquired thrombophilia      (3 Points)                                                [ ] Heparin induced thrombocytopenia                  (3 Points)                                          Total Score [    6      ]        Advanced Care Planning: Patient is DNR/DNI. Jeb will fax MOLST to me. Family will rescind for surgery.

## 2019-08-04 NOTE — PHYSICAL THERAPY INITIAL EVALUATION ADULT - PERTINENT HX OF CURRENT PROBLEM, REHAB EVAL
Pt was walking with RW at [facility] when her right leg gave out and she fell, sustaining right intertrochanteric fx with avulsion of lesser trochanter

## 2019-08-04 NOTE — CONSULT NOTE ADULT - SUBJECTIVE AND OBJECTIVE BOX
History and Physical:   Source of Information	Chart(s), Patient	  Outpatient Providers	Dr. Rosales	       History of Present Illness:  Reason for Admission: Patient fell	  History of Present Illness: 	  This is a 89 y/o female with dementia who is s/p fall at [place] today, witness - she was using her walker and her right leg gave out.  She does say she has right hip pain, 10/10, sharp, after the fall, worse with movement. Unable to ambulate or bear weight on the right lower extremity as a result of this fall. s/p previous left hip intramedullary nailing done by Dr. Dunn in the past. Ambulatory with a walker prior to this most recent injury. Brought to [facility] ER for further evaluation and treatment of same.      Review of Systems:  Review of Systems: Unable to obtain ROS from patient due to dementia	      Allergies and Intolerances:        Allergies:  	penicillin: Drug, Unknown    Home Medications:   * Patient Currently Takes Medications as of 03-Aug-2019 11:57 documented in Structured Notes  · 	Aspirin Enteric Coated 81 mg oral delayed release tablet: Last Dose Taken:  , 1 tab(s) orally once a day to start 10/08/18 (D/C Lovenox 10/07/18)  · 	cyanocobalamin 1000 mcg oral tablet: Last Dose Taken:  , 1 tab(s) orally once a day  · 	docusate sodium 100 mg oral capsule: Last Dose Taken:  , 1 cap(s) orally 3 times a day  · 	senna oral tablet: Last Dose Taken:  , 2 tab(s) orally once a day (at bedtime)  · 	acetaminophen 325 mg oral tablet: Last Dose Taken:  , 2 tab(s) orally every 6 hours, As needed, Mild Pain (1 - 3)    .    Patient History:    Past Medical, Past Surgical, and Family History:  PAST MEDICAL HISTORY:  Arthralgia     Fall     Hypothyroid     Mild dementia.     PAST SURGICAL HISTORY:  History of      History of repair of left hip joint     History of thyroidectomy.     FAMILY HISTORY:  No pertinent family history in first degree relatives.     No Pertinent Family History in first degree relatives of: Unable to obtain family history from patient due to dementia.     Social History:  Social History (marital status, living situation, occupation, tobacco use, alcohol and drug use, and sexual history): Patient denies smoking	     Tobacco Screening:  · Core Measure Site	No	    Risk Assessment:    Present on Admission:  Deep Venous Thrombosis	no	  Pulmonary Embolus	no	     Heart Failure:  Does this patient have a history of or has been diagnosed with heart failure? no.       Physical Exam:  Physical Exam: Vital Signs Last 24 Hrs  T(F): 98.6 (03 Aug 2019 11:26), Max: 98.6 (03 Aug 2019 11:26)  HR: 60 (03 Aug 2019 11:26) (56 - 60)  BP: 150/79 (03 Aug 2019 11:26) (139/78 - 150/79)  RR: 18 (03 Aug 2019 11:26) (18 - 18)  SpO2: 100% (03 Aug 2019 11:26) (97% - 100%)   PHYSICAL EXAM:  GENERAL: NAD, well-groomed, well-developed  HEAD:  Atraumatic, Normocephalic  EYES: EOMI, conjunctiva and sclera clear  ENMT: Dry mucous membranes, Good dentition, no thrush  NECK: Supple, No JVD  CHEST/LUNG: Clear to auscultation bilaterally, good air entry, non-labored breathing  HEART: RRR; S1/S2  ABDOMEN: Soft, Nontender, Nondistended; Bowel sounds present  VASCULAR: Normal pulses, Normal capillary refill  EXTREMITIES: No calf tenderness, No cyanosis, Right hip exam: +groin ttp. +mild edema. neg ecchymosis, erythema. + deformity (RLE shortened, rotated). ROM and stability testing deferred due to known acute right hip fracture. +pain with attempted ROM. grossly NVID BLE. unable to straight leg raise RLE off of the bed.  LYMPH: Normal; No lymphadenopathy noted  SKIN: Warm, abrasion on right elbow  PSYCH: Normal mood, Normal affect NERVOUS SYSTEM:  A/O x 2, Good concentration; CN 2-12 intact, No focal deficits	       Labs and Results:  Labs, Radiology, Cardiology, and Other Results: .                          13.6   7.35  )-----------( 205      ( 03 Aug 2019 10:20 )             41.2     08-03   140  |  105  |  15  ----------------------------<  112  4.2   |  24  |  1.04   Ca    9.0      03 Aug 2019 10:20   TPro  7.4  /  Alb  3.5  /  TBili  0.4  /  DBili  x   /  AST  25  /  ALT  7   /  AlkPhos  56  08-03   PT/INR - ( 03 Aug 2019 10:20 )   PT: 11.6 sec;   INR: 1.03 ratio        PTT - ( 03 Aug 2019 10:20 )  PTT:27.9 sec       < from: Xray Hip w/ Pelvis 2 or 3 Views, Right (19 @ 11:18) >   IMPRESSION:    Intratrochanteric fracture of the right hip with avulsion   of the lesser trochanter.   < end of copied text >   < from: Xray Knee w/Patella 4 View, Right (19 @ 11:16) >     IMPRESSION:   Unremarkable knee radiographs.      < end of copied text >   EKG: Ordered and pending   Case d/w Dr. Nava at length	    Assessment and Plan:    Assessment:  · Assessment		  89 y/o female with dementia who is s/p fall and now has a right hip displaced, comminuted intertrochanteric fracture  Patient medically cleared for surgery.   Currently NPO  Risks and benefits of op vs nonop tx discussed with patient and her son (Vamsi, son, HCP).  Patient and family elect to proceed with surgical intervention (i.e. intramedullary nailing right hip intertrochanteric fracture).  Surgery to be performed today.  Continue NWB RLE, pain control in the meantime.        Advanced Care Planning: Patient is DNR/DNI. Family will rescind for surgery.

## 2019-08-04 NOTE — PROGRESS NOTE ADULT - SUBJECTIVE AND OBJECTIVE BOX
Patient is a 89y old  Female who presents with a chief complaint of Patient fell (04 Aug 2019 10:10)      Patient seen and examined at bedside.    ALLERGIES:  penicillin (Unknown)    MEDICATIONS:  cyanocobalamin 1000 MICROGram(s) Oral daily  docusate sodium 100 milliGRAM(s) Oral three times a day  senna 2 Tablet(s) Oral at bedtime    Vital Signs Last 24 Hrs  T(C): 36.5 (04 Aug 2019 05:10), Max: 37 (03 Aug 2019 11:26)  T(F): 97.7 (04 Aug 2019 05:10), Max: 98.6 (03 Aug 2019 11:26)  HR: 77 (04 Aug 2019 05:10) (59 - 81)  BP: 136/67 (04 Aug 2019 05:10) (78/59 - 170/73)  BP(mean): --  RR: 15 (04 Aug 2019 05:10) (12 - 18)  SpO2: 100% (04 Aug 2019 05:10) (96% - 100%)  I&O's Summary    03 Aug 2019 07:01  -  04 Aug 2019 07:00  --------------------------------------------------------  IN: 1450 mL / OUT: 0 mL / NET: 1450 mL    04 Aug 2019 07:01  -  04 Aug 2019 10:38  --------------------------------------------------------  IN: 200 mL / OUT: 0 mL / NET: 200 mL          PAST MEDICAL & SURGICAL HISTORY:  Mild dementia  Fall  Hypothyroid  Arthralgia  History of repair of left hip joint  History of thyroidectomy  History of       Home Medications:  acetaminophen 325 mg oral tablet: 2 tab(s) orally every 6 hours, As needed, Mild Pain (1 - 3) (03 Aug 2019 11:57)  Aspirin Enteric Coated 81 mg oral delayed release tablet: 1 tab(s) orally once a day to start 10/08/18 (D/C Lovenox 10/07/18) (03 Aug 2019 11:57)  cyanocobalamin 1000 mcg oral tablet: 1 tab(s) orally once a day (03 Aug 2019 11:57)  docusate sodium 100 mg oral capsule: 1 cap(s) orally 3 times a day (03 Aug 2019 11:57)  senna oral tablet: 2 tab(s) orally once a day (at bedtime) (03 Aug 2019 11:57)      PHYSICAL EXAM:  General: NAD, A/O x2  ENT: MMM  Neck: Supple, No JVD  Lungs: Clear to percussion bilaterally  Cardio: RRR, S1/S2, No murmurs  Abdomen: Soft, Nontender, Nondistended; Bowel sounds present  Extremities: No clubbing, cyanosis, or edema, right elbow abrasion     LABS:                        9.1    9.70  )-----------( 182      ( 04 Aug 2019 05:30 )             27.9     08-04    140  |  104  |  17  ----------------------------<  134  3.8   |  24  |  1.23    Ca    8.3      04 Aug 2019 05:30    TPro  7.4  /  Alb  3.5  /  TBili  0.4  /  DBili  x   /  AST  25  /  ALT  7   /  AlkPhos  56  08-03    PT/INR - ( 03 Aug 2019 10:20 )   PT: 11.6 sec;   INR: 1.03 ratio         PTT - ( 03 Aug 2019 10:20 )  PTT:27.9 sec    RADIOLOGY & ADDITIONAL TESTS: < from: Xray Hip w/ Pelvis 2 or 3 Views, Right (19 @ 11:18) >    IMPRESSION:    Intratrochanteric fracture of the right hip with avulsion   of the lesser trochanter.    < end of copied text >      Care Discussed with Consultants/Other Providers: Hospitalist, Surgery

## 2019-08-04 NOTE — PROGRESS NOTE ADULT - PROBLEM SELECTOR PLAN 1
2/2 mechanical Fall, s/p right ORIF, post op day #1  Pain management with prn orxycodone IR,  Surgery following 2/2 mechanical Fall, s/p right ORIF, post op day #1  Pain management with prn orxycodone IR,  Surgery following  PT evaluation - subacute rehab 2/2 mechanical Fall, s/p right ORIF, post op day #1  Pain management with prn orxycodone IR,  Surgery following  PT evaluation

## 2019-08-04 NOTE — PHYSICAL THERAPY INITIAL EVALUATION ADULT - ADDITIONAL COMMENTS
Pt resides at [place] as per chart.  Pt appears pleasantly confused and is poor historian.  Pt. Alert and oriented to self only at this time.  Pt uses RW as per chart.  Pt reports she uses glasses.

## 2019-08-04 NOTE — PHYSICAL THERAPY INITIAL EVALUATION ADULT - MANUAL MUSCLE TESTING RESULTS, REHAB EVAL
at least 3/5 t/o bilateral UE, bilateral LE grossly 3+/5 t/o except right hip not tested due to sx/grossly assessed due to

## 2019-08-04 NOTE — PROGRESS NOTE ADULT - SUBJECTIVE AND OBJECTIVE BOX
Surgery PA Note:  POD #1    s/p Orif Right hip IM nail     Patient was seen and examined by me.  Patient has dementia but does answer all questions when asked.  She can become confused quickly.   She does c/o of surgical discomfort but improves when medicated.    She is now sitting in a chair and has been up with PT.    Vital Signs Last 24 Hrs  T(C): 36.5 (04 Aug 2019 05:10), Max: 37 (03 Aug 2019 11:26)  T(F): 97.7 (04 Aug 2019 05:10), Max: 98.6 (03 Aug 2019 11:26)  HR: 77 (04 Aug 2019 05:10) (59 - 81)  BP: 136/67 (04 Aug 2019 05:10) (78/59 - 170/73)  BP(mean): --  RR: 15 (04 Aug 2019 05:10) (12 - 18)  SpO2: 100% (04 Aug 2019 05:10) (96% - 100%)    PE:    Alert but confused  Lungs:  CTA   Cor:  S1S2 RR @77 bpm  Abd:  soft, non tender, +BS -BM , voiding and tolerating a Regular diet.   Ext:  Right Hip dressings clean, dry and intact.  Thigh edematous but supple.         +EHL/FHL 5/5 calf soft            Left leg w/o edema w/o calf pain                           9.1    9.70  )-----------( 182      ( 04 Aug 2019 05:30 )             27.9   08-04    140  |  104  |  17  ----------------------------<  134<H>  3.8   |  24  |  1.23    Ca    8.3<L>      04 Aug 2019 05:30    TPro  7.4  /  Alb  3.5  /  TBili  0.4  /  DBili  x   /  AST  25  /  ALT  7<L>  /  AlkPhos  56  08-03

## 2019-08-04 NOTE — PHYSICAL THERAPY INITIAL EVALUATION ADULT - GENERAL OBSERVATIONS, REHAB EVAL
Pt. encountered resting supine in bed, bilateral IPC in place, +IV, + primafit, in NAD, appears confused but agreeable to PT shruti at this time

## 2019-08-04 NOTE — PROGRESS NOTE ADULT - PROBLEM SELECTOR PLAN 2
Acute blood loss anemia 2/2 surgical procedure, H dropped to 9.1 from 13.6  Monitor H/H, transfuse if H<7.0 see above  PT eval s/p ORIF

## 2019-08-04 NOTE — PROGRESS NOTE ADULT - PROBLEM SELECTOR PLAN 3
Stable, cont., synthroid Acute blood loss anemia 2/2 surgical procedure, H dropped to 9.1 from 13.6  Monitor H/H, transfuse if H<7.0

## 2019-08-05 NOTE — PROGRESS NOTE ADULT - PROBLEM SELECTOR PLAN 3
Acute blood loss anemia 2/2 surgical procedure,   H dropped to 7.2 from 9.1 (Hgb 13 on admission)  Verbal consent obtained with Vamsi and placed in chart--scheduled for 1 iu PRBC today  Obtain post transfusion CBC Acute blood loss anemia 2/2 surgical procedure,   HgB dropped to 7.2 from 9.1 (Hgb 13 on admission)  Verbal consent obtained with Vamsi and placed in chart--scheduled for 1 iu PRBC today  Obtain post transfusion CBC

## 2019-08-05 NOTE — DIETITIAN INITIAL EVALUATION ADULT. - OTHER INFO
89 y/o female with dementia who is s/p fall at [place] today, witness - she was using her walker and her right leg gave out./p right ORIF, post op day #2. Patient noted with dementia, however reports a good appetite, noted consuming % of meals as per flow sheet. (8/30 Fecal incontinence  noted. Heel ulcer noted , Healed dry and intact, (R) elbow dry & intact. Patient noted with evidence of severe malnutrition due to loss of muscle mass & body fat(Orbital/ clavicle / scapular/ Triceps)

## 2019-08-05 NOTE — PROGRESS NOTE ADULT - PROBLEM SELECTOR PLAN 1
2/2 mechanical Fall, s/p right ORIF, post op day #2  Pain management with prn orxycodone IR,  Surgery following  PT evaluation 2/2 mechanical Fall, s/p right ORIF, post op day #2  Pain management with prn oxycodone IR,  Surgery following  PT evaluation

## 2019-08-05 NOTE — PROGRESS NOTE ADULT - SUBJECTIVE AND OBJECTIVE BOX
Patient is a 89y old  Female who presents with a chief complaint of Patient fell (04 Aug 2019 17:47). Hgb noted to be 7.2 this AM. Patient offers no complaints      Patient seen and examined at bedside.    ALLERGIES:  penicillin (Unknown)    MEDICATIONS  (STANDING):  cyanocobalamin 1000 MICROGram(s) Oral daily  docusate sodium 100 milliGRAM(s) Oral three times a day  enoxaparin Injectable 30 milliGRAM(s) SubCutaneous daily  nystatin Powder 1 Application(s) Topical two times a day  senna 2 Tablet(s) Oral at bedtime    MEDICATIONS  (PRN):  morphine  - Injectable 4 milliGRAM(s) IV Push every 3 hours PRN Severe Pain (7 - 10)  morphine  - Injectable 2 milliGRAM(s) IV Push every 3 hours PRN Moderate Pain (4 - 6)  ondansetron Injectable 4 milliGRAM(s) IV Push every 6 hours PRN Nausea and/or Vomiting  oxyCODONE    IR 5 milliGRAM(s) Oral every 3 hours PRN Mild Pain (1 - 3)    Vital Signs Last 24 Hrs  T(F): 98.9 (05 Aug 2019 10:38), Max: 99 (05 Aug 2019 04:45)  HR: 87 (05 Aug 2019 10:38) (64 - 87)  BP: 117/58 (05 Aug 2019 10:38) (117/58 - 145/55)  RR: 16 (05 Aug 2019 10:38) (15 - 16)  SpO2: 99% (05 Aug 2019 10:38) (99% - 100%)  I&O's Summary    04 Aug 2019 07:01  -  05 Aug 2019 07:00  --------------------------------------------------------  IN: 1250 mL / OUT: 1050 mL / NET: 200 mL    05 Aug 2019 07:01  -  05 Aug 2019 10:59  --------------------------------------------------------  IN: 200 mL / OUT: 0 mL / NET: 200 mL      BMI (kg/m2): 20.1 (08-03-19 @ 20:10)  PHYSICAL EXAM:  General: NAD, A/O x 2  ENT: MMM  Neck: Supple, No JVD  Lungs: Clear to auscultation bilaterally  Cardio: RRR, S1/S2,  Abdomen: Soft, Nontender, Nondistended; Bowel sounds present  Extremities: No calf tenderness, mild RLE edema, some ecchymosis R thigh     LABS:                        7.2    8.90  )-----------( 143      ( 05 Aug 2019 05:56 )             21.6       08-05    139  |  105  |  13  ----------------------------<  104  3.6   |  26  |  0.81    Ca    8.1      05 Aug 2019 05:56    TPro  7.4  /  Alb  3.5  /  TBili  0.4  /  DBili  x   /  AST  25  /  ALT  7   /  AlkPhos  56  08-03     eGFR if Non African American: 63 mL/min/1.73M2 (08-05-19 @ 05:56)  eGFR if African American: 73 mL/min/1.73M2 (08-05-19 @ 05:56)    PT/INR - ( 03 Aug 2019 10:20 )   PT: 11.6 sec;   INR: 1.03 ratio         PTT - ( 03 Aug 2019 10:20 )  PTT:27.9 sec                                 RADIOLOGY & ADDITIONAL TESTS:    Care Discussed with Consultants/Other Providers: Patient is a 89y old  Female who presents with a chief complaint of Patient fell (04 Aug 2019 17:47). Hgb noted to be 7.2 this AM. Patient offers no complaints      Patient seen and examined at bedside.    ALLERGIES:  penicillin (Unknown)    MEDICATIONS  (STANDING):  cyanocobalamin 1000 MICROGram(s) Oral daily  docusate sodium 100 milliGRAM(s) Oral three times a day  enoxaparin Injectable 30 milliGRAM(s) SubCutaneous daily  nystatin Powder 1 Application(s) Topical two times a day  senna 2 Tablet(s) Oral at bedtime    MEDICATIONS  (PRN):  morphine  - Injectable 4 milliGRAM(s) IV Push every 3 hours PRN Severe Pain (7 - 10)  morphine  - Injectable 2 milliGRAM(s) IV Push every 3 hours PRN Moderate Pain (4 - 6)  ondansetron Injectable 4 milliGRAM(s) IV Push every 6 hours PRN Nausea and/or Vomiting  oxyCODONE    IR 5 milliGRAM(s) Oral every 3 hours PRN Mild Pain (1 - 3)    Vital Signs Last 24 Hrs  T(F): 98.9 (05 Aug 2019 10:38), Max: 99 (05 Aug 2019 04:45)  HR: 87 (05 Aug 2019 10:38) (64 - 87)  BP: 117/58 (05 Aug 2019 10:38) (117/58 - 145/55)  RR: 16 (05 Aug 2019 10:38) (15 - 16)  SpO2: 99% (05 Aug 2019 10:38) (99% - 100%)  I&O's Summary    04 Aug 2019 07:01  -  05 Aug 2019 07:00  --------------------------------------------------------  IN: 1250 mL / OUT: 1050 mL / NET: 200 mL    05 Aug 2019 07:01  -  05 Aug 2019 10:59  --------------------------------------------------------  IN: 200 mL / OUT: 0 mL / NET: 200 mL      BMI (kg/m2): 20.1 (08-03-19 @ 20:10)  PHYSICAL EXAM:  General: NAD, A/O x 2  ENT: MMM  Neck: Supple, No JVD  Lungs: Clear to auscultation bilaterally  Cardio: RRR, S1/S2,  Abdomen: Soft, Nontender, Nondistended; Bowel sounds present  Extremities: No calf tenderness, mild RLE edema, some ecchymosis R thigh     LABS:                        7.2    8.90  )-----------( 143      ( 05 Aug 2019 05:56 )             21.6       08-05    139  |  105  |  13  ----------------------------<  104  3.6   |  26  |  0.81    Ca    8.1      05 Aug 2019 05:56    TPro  7.4  /  Alb  3.5  /  TBili  0.4  /  DBili  x   /  AST  25  /  ALT  7   /  AlkPhos  56  08-03     eGFR if Non African American: 63 mL/min/1.73M2 (08-05-19 @ 05:56)  eGFR if African American: 73 mL/min/1.73M2 (08-05-19 @ 05:56)    PT/INR - ( 03 Aug 2019 10:20 )   PT: 11.6 sec;   INR: 1.03 ratio         PTT - ( 03 Aug 2019 10:20 )  PTT:27.9 sec       RADIOLOGY & ADDITIONAL TESTS:    Care Discussed with Consultants/Other Providers:

## 2019-08-05 NOTE — CHART NOTE - NSCHARTNOTEFT_GEN_A_CORE
Upon Nutritional Assessment by the Registered Dietitian your patient was determined to meet criteria / has evidence of the following diagnosis/diagnoses:                 [X] Severe Protein Calorie Malnutrition    Findings as based on:  [X] Comprehensive Nutrition Assessment   [X] Nutrition Focused Physical Exam - Temporal, Orbital, Clavicle, Scapular, Bicep, Tricep, Wasting Observed      Nutrition Plan/Recommendations:    1) Ensure Enlive BID   2) change diet to Regular    PROVIDER Section:   By signing this assessment you are acknowledging and agree with the diagnosis/diagnoses assigned by the Registered Dietitian    Jyotsna Arevalo RDN

## 2019-08-05 NOTE — PROGRESS NOTE ADULT - SUBJECTIVE AND OBJECTIVE BOX
POD #2 ORIF r Hip    Pt. resting comfortably in bed. responds appropriately. no SOB No CP. receiving 1 units PRBC's at present time.    Vital Signs Last 24 Hrs  T(C): 37.2 (05 Aug 2019 10:38), Max: 37.2 (05 Aug 2019 04:45)  T(F): 98.9 (05 Aug 2019 10:38), Max: 99 (05 Aug 2019 04:45)  HR: 87 (05 Aug 2019 10:38) (64 - 87)  BP: 117/58 (05 Aug 2019 10:38) (117/58 - 145/55)  BP(mean): --  RR: 16 (05 Aug 2019 10:38) (15 - 16)  SpO2: 99% (05 Aug 2019 10:38) (99% - 100%)    PE:   GEN: NAD, alert,pale  Chest;:CTA bilat  cardio: s1 s2 RRR  Abd :SNT ND  Rhip wounds: C/D/I aquacells minimal drainage, staples intact thigh soft  RLE: NVI senory intact +DP +EHL moves toes/feet                           7.2    8.90  )-----------( 143      ( 05 Aug 2019 05:56 )             21.6   08-05    139  |  105  |  13  ----------------------------<  104<H>  3.6   |  26  |  0.81    Ca    8.1<L>      05 Aug 2019 05:56    I&O's Detail    04 Aug 2019 07:01  -  05 Aug 2019 07:00  --------------------------------------------------------  IN:    lactated ringers.: 600 mL    Oral Fluid: 650 mL  Total IN: 1250 mL    OUT:    Voided: 1050 mL  Total OUT: 1050 mL    Total NET: 200 mL

## 2019-08-06 NOTE — PROGRESS NOTE ADULT - PROBLEM SELECTOR PLAN 5
No behavioral issues, provide a calm environment, re-orient prn    DVT ppx: lovenox
No behavioral issues, provide a calm environment, re-orient prn    DVT ppx: lovenox  Dispo: discharge to [facility]
No behavioral issues, provide a calm environment, re-orient prn

## 2019-08-06 NOTE — PROGRESS NOTE ADULT - SUBJECTIVE AND OBJECTIVE BOX
POD #3 ORIF R hip   Pt resting comfortably in bed eating breakfast.  No new complaints. No sob, no CP s/p 1 unit PRBCs    Vital Signs Last 24 Hrs  T(C): 37.3 (06 Aug 2019 06:56), Max: 37.3 (06 Aug 2019 06:56)  T(F): 99.1 (06 Aug 2019 06:56), Max: 99.1 (06 Aug 2019 06:56)  HR: 74 (06 Aug 2019 06:56) (74 - 87)  BP: 118/52 (06 Aug 2019 06:56) (117/58 - 120/40)  BP(mean): --  RR: 16 (06 Aug 2019 06:56) (15 - 16)  SpO2: 96% (06 Aug 2019 06:56) (96% - 99%)    PE: R hip Incisions healing well staples intact minimal drainage noted on proximal aquacell. distal aquacellremoved wound open to air. thigh is soft  RLE: NVI sensory intact +DP +EHL no calf tenderness                          8.6    10.18 )-----------( 151      ( 06 Aug 2019 06:40 )             25.6   08-06    138  |  105  |  14  ----------------------------<  106<H>  4.2   |  26  |  0.80    Ca    8.0<L>      06 Aug 2019 06:40    I&O's Detail    05 Aug 2019 07:01  -  06 Aug 2019 07:00  --------------------------------------------------------  IN:    Oral Fluid: 450 mL    Packed Red Blood Cells: 330 mL  Total IN: 780 mL    OUT:    Voided: 850 mL  Total OUT: 850 mL    Total NET: -70 mL

## 2019-08-06 NOTE — PROGRESS NOTE ADULT - PROBLEM SELECTOR PROBLEM 1
Fracture of hip, closed, right, initial encounter

## 2019-08-06 NOTE — DISCHARGE NOTE PROVIDER - NSDCCPCAREPLAN_GEN_ALL_CORE_FT
PRINCIPAL DISCHARGE DIAGNOSIS  Diagnosis: Fracture of hip, closed, right, initial encounter  Assessment and Plan of Treatment:

## 2019-08-06 NOTE — PROGRESS NOTE ADULT - PROBLEM SELECTOR PLAN 3
Acute blood loss anemia 2/2 surgical procedure,   stable today  s/p 1 iu PRBC yesterday-HgB dropped to 7.2 from 9.1 yesterday(Hgb 13 on admission) Acute blood loss anemia 2/2 surgical procedure,   stable today  s/p 1 iu PRBC yesterday-HgB dropped to 7.2 from 9.1 yesterday(Hgb 13 on admission) stable

## 2019-08-06 NOTE — PROGRESS NOTE ADULT - ASSESSMENT
89 y/o Fwith PMH of Hypothyroidism, Dementia, s/p Fall at [place], p/w right hip fracture. SHe is POD # 3 -Right ORIF.
89 y/o female with PMH of Hypothyroidism, Dementia, s/p Fall at [place], p/w right hip fracture. SHe is POD # 2 -ight ORIF.  Noted to have a drop of Hgb post op-scheduled for transfusion today
POD #3 ORIF IM nail r hip    -continue current management  -dvt prohylaxis  -pain controlled  -h/h stabilizing continue to monitor  -d/c plan to rehab  -PT/OT WBAT RLE
POD#2 ORIF/IM NAIL R Hip    -check labs monitor H/H may require add'l unit prbcs  -WBAT RLE continue PT/OT in am  -pain controlled  -DVT prohylaxis  -d/c planning to rehab
surgically stable   anemia of acute blood loss   Dementia , hypothyroid, OA     Plan:  PT OOB FWBAT right and left leg            Lovenox for DVT prophylaxis            check labs in am            Rehab planning to [facility] as per son
89 y/o female with PMH of Hypothyroidism, Dementia, s/p Fall at [place], p/w right hip fracture s/p right ORIF.

## 2019-08-06 NOTE — DISCHARGE NOTE PROVIDER - HOSPITAL COURSE
89 y/o Fwith PMH of Hypothyroidism, Dementia, s/p Fall at [place], p/w right hip fracture. Ortho consulted. Underwent Right ORIF with no complications.      SHe is POD # 3 - PT consulted and rec [facility]. Pain is well controlled.    Of note, on POD # 2, she  was noted to have a drop in Hgb.  Given 1 iu PRBC with appropriate bump in Hgb.  Hgb at time of discharge was 8.6        Dr Reyes was contacted in terms of discharge and will follow patient at [facility].        < from: CT Head No Cont (08.04.19 @ 18:07) >        Impression:    1. Unremarkable noncontrast CT scan of the brain. A preliminary report     was given by [facility].        < end of copied text >        < from: Xray Hip w/ Pelvis 2 or 3 Views, Right (08.03.19 @ 11:18) >        MPRESSION:    Intratrochanteric fracture of the right hip with avulsion     of the lesser trochanter.        < end of copied text >

## 2019-08-06 NOTE — DISCHARGE NOTE PROVIDER - CARE PROVIDER_API CALL
Kiko Ibarra)  Orthopaedic Surgery; Sports Medicine  [address] [place] [address]  Phone: [phone]  Fax: [phone]  Follow Up Time: 2 weeks

## 2019-08-06 NOTE — DISCHARGE NOTE NURSING/CASE MANAGEMENT/SOCIAL WORK - NSDCDPATPORTLINK_GEN_ALL_CORE
You can access the [facility] Patient Portal, offered by [facility], by registering with the following website: [email or web address]

## 2019-08-06 NOTE — PROGRESS NOTE ADULT - PROBLEM SELECTOR PLAN 1
2/2 mechanical Fall, s/p right ORIF, post op day #3  Pain management with prn oxycodone IR,  Surgery following  PT evaluation 2/2 mechanical Fall, s/p right ORIF, post op day #3  Pain management with prn oxycodone IR,  Surgery following  PT evaluation -subacute rehab

## 2019-08-06 NOTE — PROGRESS NOTE ADULT - SUBJECTIVE AND OBJECTIVE BOX
Patient is a 89y old  Female who presents with a chief complaint of Patient fell (06 Aug 2019 08:57). POD #3. No acute issues overnight       Patient seen and examined at bedside.    ALLERGIES:  penicillin (Unknown)    MEDICATIONS  (STANDING):  cyanocobalamin 1000 MICROGram(s) Oral daily  docusate sodium 100 milliGRAM(s) Oral three times a day  enoxaparin Injectable 30 milliGRAM(s) SubCutaneous daily  nystatin Powder 1 Application(s) Topical two times a day  senna 2 Tablet(s) Oral at bedtime    MEDICATIONS  (PRN):  morphine  - Injectable 4 milliGRAM(s) IV Push every 3 hours PRN Severe Pain (7 - 10)  morphine  - Injectable 2 milliGRAM(s) IV Push every 3 hours PRN Moderate Pain (4 - 6)  ondansetron Injectable 4 milliGRAM(s) IV Push every 6 hours PRN Nausea and/or Vomiting  oxyCODONE    IR 5 milliGRAM(s) Oral every 3 hours PRN Mild Pain (1 - 3)    Vital Signs Last 24 Hrs  T(F): 99.1 (06 Aug 2019 06:56), Max: 99.1 (06 Aug 2019 06:56)  HR: 74 (06 Aug 2019 06:56) (74 - 87)  BP: 118/52 (06 Aug 2019 06:56) (117/58 - 120/40)  RR: 16 (06 Aug 2019 06:56) (15 - 16)  SpO2: 96% (06 Aug 2019 06:56) (96% - 99%)  I&O's Summary    05 Aug 2019 07:01  -  06 Aug 2019 07:00  --------------------------------------------------------  IN: 780 mL / OUT: 850 mL / NET: -70 mL      BMI (kg/m2): 20.1 (08-03-19 @ 20:10)  PHYSICAL EXAM:  General: NAD, A/O x 2  ENT: MMM  Neck: Supple, No JVD  Lungs: Clear to auscultation bilaterally  Cardio: RRR, S1/S2, No murmurs  Abdomen: Soft, Nontender, Nondistended; Bowel sounds present  Extremities: No calf tenderness,  R hip Incisions healing well staples intact minimal drainage     LABS:                        8.6    10.18 )-----------( 151      ( 06 Aug 2019 06:40 )             25.6       08-06    138  |  105  |  14  ----------------------------<  106  4.2   |  26  |  0.80    Ca    8.0      06 Aug 2019 06:40    TPro  7.4  /  Alb  3.5  /  TBili  0.4  /  DBili  x   /  AST  25  /  ALT  7   /  AlkPhos  56  08-03     eGFR if Non African American: 64 mL/min/1.73M2 (08-06-19 @ 06:40)  eGFR if : 74 mL/min/1.73M2 (08-06-19 @ 06:40)    PT/INR - ( 03 Aug 2019 10:20 )   PT: 11.6 sec;   INR: 1.03 ratio         PTT - ( 03 Aug 2019 10:20 )  PTT:27.9 sec                                 RADIOLOGY & ADDITIONAL TESTS:    Care Discussed with Consultants/Other Providers:

## 2019-08-06 NOTE — PROGRESS NOTE ADULT - ATTENDING COMMENTS
Patient personally seen by me this afternoon.  Currently sitting out of bed in a chair.  No significant postop right hip pain complaints.  Post op dressings clean/dry/intact. Neg drainage.  Grossly NVID BLE.  Agree with above.
HgB stable s/p 1 pRBCs yesterday, evaluated by PT, dispo to subacute rehab. Dr. John Reyes informed will follow up outpt      Time  spent on discharge 34 minutes
Anemia 2/2 acute blood loss POD2,    blood transfusion consent obtained, transfuse 1 unit pRBCs, check post-transfusion CBC

## 2019-11-14 NOTE — ED PROVIDER NOTE - ATTENDING CONTRIBUTION TO CARE
Attending MD Peoples:   I personally have seen and examined this patient.  ACP, Resident, medical student note reviewed and agree on plan of care and except where noted.    89y F PMH dementia, hypothyroid, right femur fx s/p orif BIBEMS from NH for fall out of bed. Per report, patient was found down by NH staff. No family at bedside. Patient denies complaints, is A+Ox2.     On exam vitals wnl, c-colalr in place. irregularly irregular s1s2, lungs ctab, abdomen soft ntnd, bruising to left face, no tenderness to palpation, skin tears and bruising to bilateral UE.     Concern for traumatic injury 2/2 mechanical fall vs syncope, poor historian 2/2 dementia. Some bruises appear old. will obtain trauma scan, ekg, labs, trop, admit. Attending MD Peoples:   I personally have seen and examined this patient.  ACP, Resident, medical student note reviewed and agree on plan of care and except where noted.    89y F PMH dementia, hypothyroid, right femur fx s/p orif BIBEMS from NH for fall out of bed. Per report, patient was found down by NH staff. No family at bedside. Patient denies complaints, is A+Ox2.     On exam vitals wnl, c-collar in place. irregularly irregular s1s2, lungs ctab, abdomen soft ntnd, bruising to left face, no tenderness to palpation, skin tears and bruising to bilateral UE.     Concern for traumatic injury 2/2 mechanical fall vs syncope, poor historian 2/2 dementia. Some bruises appear old. will obtain trauma scan, ekg, labs, trop, collateral, admit.

## 2019-11-14 NOTE — ED PROVIDER NOTE - ST/T WAVE
RX for multiple refilled per Dr Gorman's protocol and eprescribed to preferred pharmacy.    
no YAMILEX

## 2019-11-14 NOTE — CONSULT NOTE ADULT - SUBJECTIVE AND OBJECTIVE BOX
Patient seen and evaluated at bedside    Chief Complaint:  fell    HPI:  Pt has severe dementia so hx is difficult to obtain and is mainly from chart review.  Pt is a 89F w/ hx of hypothyroid, HTN here for fall. Circumstances unclear, pt states she was going to the bathroom and fell.  According to chart review, was found next to bed, unknown LOC.  On ASA for unknown reasons.  Pt brought to ED for further eval      PMHx: as above      PSHx: unknown      Allergies:  penicillins (Unknown)      Home Meds: reviewed    Current Medications:  none      FAMILY HISTORY: unknown      Social History:  Smoking History: denies  Alcohol Use: denies  Drug Use: denies    REVIEW OF SYSTEMS:    [x ] Unable to assess ROS due to severe dementia      Physical Exam:  T(F): 97.7 (11-14), Max: 97.7 (11-14)  HR: 82 (11-14) (82 - 90)  BP: 142/62 (11-14) (136/111 - 177/74)  RR: 14 (11-14)  SpO2: 98% (11-14)  GENERAL: No acute distress, well-developed  HEAD:  ecchymosis over left eye and chin, in c-collar  ENT: EOMI, PERRLA, conjunctiva and sclera clear No JVD, moist mucosa  CHEST/LUNG: Clear to auscultation anteriorly  HEART: Regular rate and rhythm; soft DEV murmur, normal s1/s2, no rubs or gallops  ABDOMEN: Soft, Nontender, Nondistended; Bowel sounds present  EXTREMITIES:  No clubbing, cyanosis, or edema    LINES:    Cardiovascular Diagnostic Testing:    ECG: Personally reviewed:  afib w/ occasional sinus beats, @ 90BPM    Echo: Personally reviewed:         CXR: Personally reviewed    Labs: Personally reviewed                        12.2   20.11 )-----------( 328      ( 14 Nov 2019 09:31 )             36.3     11-14    136  |  95<L>  |  22  ----------------------------<  137<H>  4.4   |  24  |  0.59    Ca    9.6      14 Nov 2019 09:31    TPro  7.1  /  Alb  3.6  /  TBili  0.6  /  DBili  x   /  AST  42<H>  /  ALT  26  /  AlkPhos  92  11-14    PT/INR - ( 14 Nov 2019 09:31 )   PT: 12.2 sec;   INR: 1.06 ratio         PTT - ( 14 Nov 2019 09:31 )  PTT:28.8 sec

## 2019-11-14 NOTE — ED PROVIDER NOTE - PROGRESS NOTE DETAILS
AV: received call from lab, positive troponin. Cards fellow MD Wang consulted for NSTEMI, will hold anticoagulation at this time in setting of trauma, workup pending. AV: c-collar cleared by NEXUS criteria.

## 2019-11-14 NOTE — H&P ADULT - PROBLEM SELECTOR PLAN 2
likely chronic   son states that patient refuses to take any meds generally  will continue to monitor

## 2019-11-14 NOTE — ED PROVIDER NOTE - PHYSICAL EXAMINATION
Vitals: WNL  Gen: laying comfortably in NAD  Head: NCAT  ENT: sclerae white, anicterus, moist mucous membranes. No exudates. L periorbital ecchymosis, L chin ecchymosis, ccollar in place, no nasal septal hematoma, no c spine midline tenderness  CV: RRR. Audible S1 and S2. No murmurs, rubs, gallops, S3, nor S4, 2+ radial and DP pulses   Pulm: Clear to auscultation bilaterally. No wheezes, rales, or rhonchi  Abd: soft, normoactive BS x4, NTND, no rebound, no guarding, no rashes  Musculoskeletal:  No peripheral edema, no c/t/l spine stepoffs/ecchymosis  Skin: L periorbital ecchymosis, L chin ecchymosis, old healing ecchymosis R forearm  Neurologic: AAOx1, motor extremities x4 5/5, sensation equal and intact b/l  : no CVA tenderness

## 2019-11-14 NOTE — H&P ADULT - NSHPPHYSICALEXAM_GEN_ALL_CORE
PHYSICAL EXAM: vital signs as above  cachectic  in no apparent distress  HEENT: RICARDO EOMI  Neck: Supple, no JVD, no thyromegaly  Lungs: no wheeze, no crackles  CVS: S1 S2 soft ejection systolic murmur best heard at left sternal border no rub no gallop   Abdomen: no tenderness, no organomegaly, BS present  Neuro: AO x 0 no focal weakness, no sensory abnormalities moving all 4 limbs  Skin: warm, dry extensive ecchymoses over extremities including right knee left forehead, face   Ext: no cyanosis or clubbing, no edema + sarcopenia  Msk: no joint swelling or deformities  Back: no CVA tenderness, no kyphosis/scoliosis

## 2019-11-14 NOTE — ED PROVIDER NOTE - CLINICAL SUMMARY MEDICAL DECISION MAKING FREE TEXT BOX
89yo F h/o dementia found on floor in NH. dementia at baseline, otherwise nonfocal neuro deficits outside of AAOx1. old ecchymosis noted, no new bruising noted. will image head, cspine. unclear cause of fall. will eval for cardiac causes, infectious causes.

## 2019-11-14 NOTE — CONSULT NOTE ADULT - ASSESSMENT
A/P  89F w/ hx of hypothyroid, HTN here for fall, found to have elevated troponin.    #elevated troponin  -initially HDS (although now suspect febrile and hypotension), asx  -EKG w/o ischemic changes  -do not suspect ACS  -already on ASA 81mg  -has DEV, check TTE  -can trend CE  -start atorvastatin 40mg qHS      Will discuss w/ attending  Andrea Wang MD  Cardiology Fellow - PGY 4  Text or Call: [phone]  For all New Consults and Questions:  [email or web address]   Login: [email or web address]  89F w/ hx of hypothyroid, HTN here for fall, found to have elevated troponin.    #elevated troponin  -initially HDS (although now suspect febrile and hypotension), asx  -EKG w/o ischemic changes, trop mildly elevated 56  -do not suspect ACS, possible type II NSTEMI in setting of ??underlying systemic process given WBC 20k  -already on ASA 81mg  -has DEV, check TTE  -can trend CE  -start atorvastatin 40mg qHS      Will discuss w/ attending  Andrea Wang MD  Cardiology Fellow - PGY 4  Text or Call: [phone]  For all New Consults and Questions:  [email or web address]   Login: [email or web address] A/P  89F w/ hx of hypothyroid, HTN here for fall, found to have elevated troponin.    #elevated troponin  -initially HDS (although now suspect febrile and hypotension), asx  -EKG w/o ischemic changes, trop mildly elevated 56  -do not suspect ACS, possible type II NSTEMI in setting of ??underlying systemic process given WBC 20k  -already on ASA 81mg  -has DEV, check TTE  -can trend CE  -start atorvastatin 40mg qHS  -Monitor for 24hrs on tele      Will discuss w/ attending  Andrea Wang MD  Cardiology Fellow - PGY 4  Text or Call: [phone]  For all New Consults and Questions:  [email or web address]   Login: [email or web address]

## 2019-11-14 NOTE — ED PROVIDER NOTE - CARE PLAN
Principal Discharge DX:	Atrial fibrillation Principal Discharge DX:	Atrial fibrillation  Secondary Diagnosis:	NSTEMI (non-ST elevated myocardial infarction)  Secondary Diagnosis:	Fall, initial encounter

## 2019-11-14 NOTE — H&P ADULT - PROBLEM SELECTOR PLAN 4
cardiology help appreciated  will follow recommendations   start statin after CPK normalizes  doubt acute cardiac event

## 2019-11-14 NOTE — H&P ADULT - HISTORY OF PRESENT ILLNESS
89 yo F PMH of dementia found on floor in SNF where she resides. The patient is confused and unable to provide any history, states she is here for an 'eye operation' Her baseline mental status is unclear, circumstances of the fall are unclear. Denies any current symptoms. Spoke to her son who states that patient resides in the SNF long term and has had multiple falls and fractures in the past. Dementia +and  DNR +

## 2019-11-14 NOTE — H&P ADULT - ASSESSMENT
89 yo F PMH of dementia found on floor in SNF where she resides. Found to have leukocytosis and elevated trop, doubt acute cardiac event

## 2019-11-14 NOTE — ED PROVIDER NOTE - OBJECTIVE STATEMENT
89yo F h/o dementia found on floor in NH. unclear baseline. pt no complaints at this time. 89yo F h/o dementia found on floor in NH. unclear baseline. pt no complaints at this time.     AV: No Family at bedside. Limited information available from NH paperwork. Patient is A+Ox2 on initially evaluation. She cannot recall how she got the bruises on her face and arms.

## 2019-11-14 NOTE — H&P ADULT - NSHPSOCIALHISTORY_GEN_ALL_CORE
How Severe Are Your Spot(S)?: moderate
What Is The Reason For Today's Visit?: Full Body Skin Examination
What Is The Reason For Today's Visit? (Being Monitored For X): concerning skin lesions on an annual basis
non smoker  no IVDA  no ETOH abuse   lives in SNF

## 2019-11-14 NOTE — H&P ADULT - NSICDXPASTSURGICALHX_GEN_ALL_CORE_FT
PAST SURGICAL HISTORY:  Status post-operative repair of closed fracture of left hip     Status post-operative repair of closed fracture of right hip

## 2019-11-15 NOTE — PROGRESS NOTE ADULT - SUBJECTIVE AND OBJECTIVE BOX
Patient is a 89y old  Female who presents with a chief complaint of s/p fall (15 Nov 2019 07:20)      SUBJECTIVE / OVERNIGHT EVENTS: overnight events noted  denies chest pain but reliability uncertain    ROS: per RN  Resp: No cough no sputum production  GI: no N/V/D        MEDICATIONS  (STANDING):  aspirin enteric coated 81 milliGRAM(s) Oral daily  colchicine 0.6 milliGRAM(s) Oral daily  enoxaparin Injectable 30 milliGRAM(s) SubCutaneous daily  multivitamin 1 Tablet(s) Oral daily    MEDICATIONS  (PRN):        CAPILLARY BLOOD GLUCOSE        I&O's Summary    15 Nov 2019 07:01  -  15 Nov 2019 13:09  --------------------------------------------------------  IN: 0 mL / OUT: 50 mL / NET: -50 mL        Vital Signs Last 24 Hrs  T(C): 36.8 (15 Nov 2019 05:15), Max: 37.4 (2019 21:04)  T(F): 98.2 (15 Nov 2019 05:15), Max: 99.3 (2019 21:04)  HR: 90 (15 Nov 2019 05:15) (90 - 98)  BP: 108/59 (15 Nov 2019 05:15) (108/59 - 141/77)  BP(mean): --  RR: 18 (15 Nov 2019 05:15) (18 - 18)  SpO2: 99% (15 Nov 2019 05:15) (97% - 100%)    PHYSICAL EXAM: vital signs as above  cachectic  in no apparent distress  HEENT: RICARDO EOMI  Neck: Supple, no JVD, no thyromegaly  Lungs: no wheeze, no crackles  CVS: S1 S2 soft ejection systolic murmur best heard at left sternal border no rub no gallop   Abdomen: no tenderness, no organomegaly, BS present  Neuro: AO x 0 no focal weakness, no sensory abnormalities moving all 4 limbs  Skin: warm, dry extensive ecchymoses over extremities including right knee left forehead, face   Ext: no cyanosis or clubbing, no edema + sarcopenia  Msk: no joint swelling or deformities  Back: no CVA tenderness, no kyphosis/scoliosis    LABS:                        9.8    11.53 )-----------( 265      ( 15 Nov 2019 07:08 )             29.6     11-15    139  |  100  |  26<H>  ----------------------------<  106<H>  3.8   |  27  |  0.91    Ca    9.3      15 Nov 2019 07:08    TPro  7.1  /  Alb  3.6  /  TBili  0.6  /  DBili  x   /  AST  42<H>  /  ALT  26  /  AlkPhos  92  11-14    PT/INR - ( 2019 09:31 )   PT: 12.2 sec;   INR: 1.06 ratio         PTT - ( 2019 09:31 )  PTT:28.8 sec  CARDIAC MARKERS ( 15 Nov 2019 07:08 )  x     / x     / 618 U/L / x     / x      CARDIAC MARKERS ( 15 Nov 2019 01:36 )  x     / x     / 793 U/L / x     / 7.6 ng/mL  CARDIAC MARKERS ( 2019 13:16 )  x     / x     / 523 U/L / x     / 11.7 ng/mL      Urinalysis Basic - ( 2019 12:02 )    Color: Yellow / Appearance: Clear / S.015 / pH: x  Gluc: x / Ketone: Trace  / Bili: Negative / Urobili: Negative   Blood: x / Protein: 30 mg/dL / Nitrite: Negative   Leuk Esterase: Negative / RBC: 10 /hpf / WBC 1 /HPF   Sq Epi: x / Non Sq Epi: 0 /hpf / Bacteria: 0.0          All consultant(s) notes reviewed and care discussed with other providers        Contact Number, Dr George [phone]

## 2019-11-15 NOTE — PROGRESS NOTE ADULT - ASSESSMENT
89 yo F PMH of dementia found on floor in SNF where she resides. Found to have leukocytosis and elevated trop, doubt acute cardiac event

## 2019-11-15 NOTE — PROGRESS NOTE ADULT - ASSESSMENT
A/P  89F w/ hx of hypothyroid, HTN here for fall, found to have elevated troponin.    #elevated troponin  -HDS, asx  -EKG now showing signs of pericarditis  -given pt asymptomatic, would check ESR/CRP  -if inflammatory markers, consider starting colchicine 0.6mg daily for 3 months with further treatment pending repeat markers as outpatient  -already on ASA 81mg  -pending TTE  -can reduce atorvastatin 20mg qHS    Above recs are final.  If TTE without gross abnormalities, we will sign off at this time.  Please call with any questions.    WIll discuss w/ Dr. Bhupinder Wang MD  Cardiology Fellow - PGY 4  Text or Call: [phone]  For all New Consults and Questions:  [email or web address]   Login: [email or web address]

## 2019-11-15 NOTE — PROGRESS NOTE ADULT - PROBLEM SELECTOR PLAN 4
likely chronic   continue ASA 81 mg po qd  patient apparently stopped taking all her meds a few years ago  will continue to monitor

## 2019-11-15 NOTE — PROGRESS NOTE ADULT - PROBLEM SELECTOR PLAN 1
cardiology attending follow up appreciated  likely pericarditis  echocardiogram ordered  will start colchicine 0.6 mg po qd

## 2019-11-15 NOTE — PHYSICAL THERAPY INITIAL EVALUATION ADULT - BALANCE TRAINING, PT EVAL
GOAL: Pt will be able to perform static sitting at edge of bed with minimum assistance of 1 in 4 weeks

## 2019-11-15 NOTE — PHYSICAL THERAPY INITIAL EVALUATION ADULT - ADDITIONAL COMMENTS
As per son due to pt being A&Ox1 with a history of dementia, pt lives in [place]. Pt requires assistance for ambulation with rolling walker for short distances, bed mobility, and transfers. Pt dependent for ADLs.

## 2019-11-15 NOTE — PHYSICAL THERAPY INITIAL EVALUATION ADULT - TRANSFER SAFETY CONCERNS NOTED: SIT/STAND, REHAB EVAL
decreased weight-shifting ability/decreased step length/inability to maintain weight-bearing restrictions w/o assist

## 2019-11-15 NOTE — CHART NOTE - NSCHARTNOTEFT_GEN_A_CORE
Medicine PA Note    Name: LORI MILLER  MRN: [id number]  Age: 89y Gender: Female    CC: ST-Elevation on telemetry    Called by RN to evaluate patient for ST-elevation on telemetry. Patient was seen and examined by me at the bedside. Patient was in no acute distress and non-toxic in appearance. Patient denies the presence of chest pain, palpitations, cough, dyspnea, diaphoresis, dizziness/lightheadedness, N/V, HA, and/or visual changes. ROS limited by patients dementia.     Vital Signs Last 24 Hrs  T(C): 36.8 (15 Nov 2019 05:15), Max: 37.4 (14 Nov 2019 21:04)  T(F): 98.2 (15 Nov 2019 05:15), Max: 99.3 (14 Nov 2019 21:04)  HR: 90 (15 Nov 2019 05:15) (82 - 98)  BP: 108/59 (15 Nov 2019 05:15) (108/59 - 177/74)  RR: 18 (15 Nov 2019 05:15) (14 - 24)  SpO2: 99% (15 Nov 2019 05:15) (97% - 100%)                        12.2   20.11 )-----------( 328      ( 14 Nov 2019 09:31 )             36.3     11-14    136  |  95<L>  |  22  ----------------------------<  137<H>  4.4   |  24  |  0.59    Ca    9.6      14 Nov 2019 09:31    TPro  7.1  /  Alb  3.6  /  TBili  0.6  /  DBili  x   /  AST  42<H>  /  ALT  26  /  AlkPhos  92  11-14      Radiology: < from: 12 Lead ECG (11.14.19 @ 09:08) >      Ventricular Rate 90 BPM    Atrial Rate 72 BPM    QRS Duration 70 ms    Q-T Interval 414 ms    QTC Calculation(Bezet) 506 ms    R Axis 44 degrees    T Axis 78 degrees    Diagnosis Line ATRIAL FIBRILLATION WITH A COMPETING JUNCTIONAL PACEMAKER  ABNORMAL ECG    Confirmed by ATTENDING, ED ([id number]),  CLARK PANTOJA ([id number]) on 11/14/2019 9:10:04 AM    < end of copied text >    PHYSICAL EXAM:  GENERAL: A well-developed thin in no acute distress. Non-toxic appearing. Speaking in full sentences.  SKIN: (+) multiple diffused ecchymosis on trunk and extremities. (+) facial bruising.   HEAD: Atraumatic and normocephalic.  EYES: EOMI, PERRLA, conjunctiva and sclera clear.  CHEST/LUNG: Clear to auscultation bilaterally. No wheezing, rhonchi, or rales. Chest expansion symmetrical. No accessory muscle use.  HEART: Regular rate and rhythm. No rubs or gallops. No palpable thrill. (+) Murmur 2/6  ABDOMEN: Soft, nontender, and nondistended. Bowel sounds present in all 4 quadrants.  EXTREMITIES: 2+ peripheral pulses. No clubbing, cyanosis, or edema. (+) diffused ecchymosis.   PSYCH: AAOx1. Appropriate affect.  NEUROLOGY: No focal deficits.    ASSESSMENT/PLAN:   89F PMH of dementia found on floor in SNF where she resides. The patient is confused and unable to provide any history, states she is here for an 'eye operation' Her baseline mental status is unclear, circumstances of the fall are unclear. Denies any current symptoms. Spoke to her son who states that patient resides in the SNF long term and has had multiple falls and fractures in the past. Dementia +and  DNR +. Patient now presents with ST-Elevation in lead II.    #Isolated ST-Elevation  - EKG ordered - AFib w/ ST-elevation in lead II  - Telemetry reviewed - ST-Elevation was present upon admission to the floor  - Cardiology made aware and saw patient at bedside   - Repeat EKG in AM  - Continue to trend HsTrop    Will continue to monitor closely and endorse to primary team in AM.    Brooke Choudhury PA-C  Department of Medicine   [facility]

## 2019-11-15 NOTE — PHYSICAL THERAPY INITIAL EVALUATION ADULT - PERTINENT HX OF CURRENT PROBLEM, REHAB EVAL
Pt is a 89 F with a PMH of dementia, hypothyroid, PPM, multiple falls, recent Left hip and Right hip ORIFs that presented s/p fall from SNF. Pt found to have leukocytosis and elvated trop. CT Head/maxilofacial:soft tissue swelling and small hematoma of the left cheek.

## 2019-11-15 NOTE — PHYSICAL THERAPY INITIAL EVALUATION ADULT - IMPAIRMENTS FOUND, PT EVAL
poor safety awareness/ROM/arousal, attention, and cognition/gait, locomotion, and balance/muscle strength

## 2019-11-15 NOTE — PROGRESS NOTE ADULT - SUBJECTIVE AND OBJECTIVE BOX
Patient seen and examined at bedside.    Overnight Events:   Overnight noted to have YAMILEX on telemetry.  EKG obtained, suspicious for pericarditis.  Asx at the time.  DEnies CP, palpitations, SOB at present time otherwise ROS limited 2/2 dementia.      REVIEW OF SYSTEMS:    [ x] Unable to assess ROS due to severe dementia    Current Meds:  aspirin enteric coated 81 milliGRAM(s) Oral daily  enoxaparin Injectable 30 milliGRAM(s) SubCutaneous daily  multivitamin 1 Tablet(s) Oral daily      PAST MEDICAL & SURGICAL HISTORY:  Severe dementia  Status post-operative repair of closed fracture of right hip  Status post-operative repair of closed fracture of left hip      Vitals:  T(F): 98.2 (11-15), Max: 99.3 (11-14)  HR: 90 (11-15) (82 - 98)  BP: 108/59 (11-15) (108/59 - 177/74)  RR: 18 (11-15)  SpO2: 99% (11-15)  I&O's Summary    15 Nov 2019 07:01  -  15 Nov 2019 07:20  --------------------------------------------------------  IN: 0 mL / OUT: 50 mL / NET: -50 mL        Physical Exam:  GENERAL: No acute distress, well-developed  HEAD:  ecchymosis over left eye and chin, in c-collar  ENT: EOMI, PERRLA, conjunctiva and sclera clear No JVD, moist mucosa  CHEST/LUNG: Clear to auscultation anteriorly  HEART: Regular rate and rhythm; soft DEV murmur, normal s1/s2, no rubs or gallops  ABDOMEN: Soft, Nontender, Nondistended; Bowel sounds present  EXTREMITIES:  No clubbing, cyanosis, or edema                          12.2   20.11 )-----------( 328      ( 14 Nov 2019 09:31 )             36.3     11-14    136  |  95<L>  |  22  ----------------------------<  137<H>  4.4   |  24  |  0.59    Ca    9.6      14 Nov 2019 09:31    TPro  7.1  /  Alb  3.6  /  TBili  0.6  /  DBili  x   /  AST  42<H>  /  ALT  26  /  AlkPhos  92  11-14    PT/INR - ( 14 Nov 2019 09:31 )   PT: 12.2 sec;   INR: 1.06 ratio         PTT - ( 14 Nov 2019 09:31 )  PTT:28.8 sec  CARDIAC MARKERS ( 15 Nov 2019 01:36 )  x     / x     / 793 U/L / x     / 7.6 ng/mL  CARDIAC MARKERS ( 14 Nov 2019 13:16 )  x     / x     / 523 U/L / x     / 11.7 ng/mL        Interpretation of Telemetry:  NSR 60-90s, ectopic acps

## 2019-11-16 NOTE — PROGRESS NOTE ADULT - SUBJECTIVE AND OBJECTIVE BOX
Patient is a 89y old  Female who presents with a chief complaint of s/p fall (15 Nov 2019 13:09)      SUBJECTIVE / OVERNIGHT EVENTS: overnight events noted    ROS:  Resp: No cough no sputum production  CVS: No chest pain no palpitations no orthopnea  GI: no N/V/D  : no dysuria, no hematuria  Neuro: no weakness no paresthesias  Heme: No petechiae no easy bruising  Msk: No joint pain no swelling  Skin: No rash no itching        MEDICATIONS  (STANDING):  aspirin enteric coated 81 milliGRAM(s) Oral daily  colchicine 0.6 milliGRAM(s) Oral daily  enoxaparin Injectable 30 milliGRAM(s) SubCutaneous daily  multivitamin 1 Tablet(s) Oral daily    MEDICATIONS  (PRN):        CAPILLARY BLOOD GLUCOSE        I&O's Summary    15 Nov 2019 07:01  -  16 Nov 2019 07:00  --------------------------------------------------------  IN: 440 mL / OUT: 50 mL / NET: 390 mL    16 Nov 2019 07:01  -  16 Nov 2019 16:13  --------------------------------------------------------  IN: 120 mL / OUT: 0 mL / NET: 120 mL        Vital Signs Last 24 Hrs  T(C): 36.5 (16 Nov 2019 12:23), Max: 37.7 (15 Nov 2019 20:00)  T(F): 97.7 (16 Nov 2019 12:23), Max: 99.9 (15 Nov 2019 20:00)  HR: 85 (16 Nov 2019 12:23) (85 - 100)  BP: 135/73 (16 Nov 2019 12:23) (123/70 - 138/71)  BP(mean): --  RR: 18 (16 Nov 2019 12:23) (18 - 18)  SpO2: 99% (16 Nov 2019 12:23) (97% - 99%)    PHYSICAL EXAM: vital signs as above  cachectic  in no apparent distress  HEENT: RICARDO EOMI  Neck: Supple, no JVD, no thyromegaly  Lungs: no wheeze, no crackles  CVS: S1 S2 soft ejection systolic murmur best heard at left sternal border no rub no gallop   Abdomen: no tenderness, no organomegaly, BS present  Neuro: AO x 0 no focal weakness, no sensory abnormalities moving all 4 limbs  Skin: warm, dry extensive ecchymoses over extremities including right knee left forehead, face   Ext: no cyanosis or clubbing, no edema + sarcopenia  Msk: no joint swelling or deformities  Back: no CVA tenderness, no kyphosis/scoliosis    LABS:                        10.0   11.30 )-----------( 265      ( 16 Nov 2019 05:57 )             32.0     11-16    137  |  100  |  26<H>  ----------------------------<  108<H>  3.8   |  25  |  0.76    Ca    9.1      16 Nov 2019 05:57        CARDIAC MARKERS ( 15 Nov 2019 07:08 )  x     / x     / 618 U/L / x     / x      CARDIAC MARKERS ( 15 Nov 2019 01:36 )  x     / x     / 793 U/L / x     / 7.6 ng/mL            All consultant(s) notes reviewed and care discussed with other providers        Contact Number, Dr George [phone]

## 2019-11-16 NOTE — PROGRESS NOTE ADULT - PROBLEM SELECTOR PLAN 1
cardiology attending follow up appreciated  likely pericarditis  echocardiogram pending   will continue colchicine 0.6 mg po qd

## 2019-11-17 NOTE — DIETITIAN INITIAL EVALUATION ADULT. - ENERGY NEEDS
ht: 62 inches. wt: 120.5 pounds (current, standing, no edema noted). BMI: 22.0 kG/m2. UBW: 145 pounds per pt; unclear as to how long ago or accuracy as pt is poor historian. IBW: 110 pounds +/- 10%. %IBW: 110%.  Other pertinent objective information: Pt is a 89 year old female with PMH dementia found on floor in SNF where she resides. Found to have leukocytosis and elevated trop, per IM note doubt acute cardiac event. Noted likely pericarditis,  echocardiogram pending, pt ordered for colchicine. Noted with R heel unstagable pressure injury.

## 2019-11-17 NOTE — DIETITIAN INITIAL EVALUATION ADULT. - DIET TYPE
Dysphagia 1 Pureed with Nectar Thickened liquids regular/Ensure Enlive x 3 daily (1050 kcal, 60 grams protein)

## 2019-11-17 NOTE — PROGRESS NOTE ADULT - SUBJECTIVE AND OBJECTIVE BOX
Patient is a 89y old  Female who presents with a chief complaint of s/p fall (16 Nov 2019 16:07)      SUBJECTIVE / OVERNIGHT EVENTS: overnight events noted    ROS:  Resp: No cough no sputum production  CVS: No chest pain no palpitations no orthopnea  GI: no N/V/D  : no dysuria, no hematuria  Neuro: no weakness no paresthesias  Heme: No petechiae no easy bruising  Msk: No joint pain no swelling  Skin: No rash no itching        MEDICATIONS  (STANDING):  aspirin enteric coated 81 milliGRAM(s) Oral daily  colchicine 0.6 milliGRAM(s) Oral daily  enoxaparin Injectable 30 milliGRAM(s) SubCutaneous daily  multivitamin 1 Tablet(s) Oral daily    MEDICATIONS  (PRN):        CAPILLARY BLOOD GLUCOSE        I&O's Summary    16 Nov 2019 07:01  -  17 Nov 2019 07:00  --------------------------------------------------------  IN: 120 mL / OUT: 200 mL / NET: -80 mL        Vital Signs Last 24 Hrs  T(C): 36.5 (17 Nov 2019 12:18), Max: 36.9 (17 Nov 2019 04:47)  T(F): 97.7 (17 Nov 2019 12:18), Max: 98.5 (17 Nov 2019 04:47)  HR: 86 (17 Nov 2019 12:18) (86 - 91)  BP: 128/69 (17 Nov 2019 12:18) (128/69 - 133/77)  BP(mean): --  RR: 18 (17 Nov 2019 12:18) (18 - 18)  SpO2: 98% (17 Nov 2019 12:18) (96% - 99%)    PHYSICAL EXAM: vital signs as above  cachectic  healing ecchymoses left face and head  in no apparent distress  HEENT: RICARDO EOMI  Neck: Supple, no JVD, no thyromegaly  Lungs: no wheeze, no crackles  CVS: S1 S2 soft ejection systolic murmur best heard at left sternal border no rub no gallop   Abdomen: no tenderness, no organomegaly, BS present  Neuro: AO x 0 no focal weakness, no sensory abnormalities moving all 4 limbs  Skin: warm, dry extensive ecchymoses over extremities including right knee left forehead, face   Ext: no cyanosis or clubbing, no edema + sarcopenia  Msk: no joint swelling or deformities  Back: no CVA tenderness, no kyphosis/scoliosis    LABS:                        10.1   12.31 )-----------( 290      ( 17 Nov 2019 07:10 )             32.6     11-17    140  |  101  |  27<H>  ----------------------------<  100<H>  4.0   |  24  |  0.69    Ca    9.2      17 Nov 2019 07:10                  All consultant(s) notes reviewed and care discussed with other providers        Contact Number, Dr George [phone]

## 2019-11-17 NOTE — DIETITIAN INITIAL EVALUATION ADULT. - ADD RECOMMEND
Recommend change diet to regular consistency per diet at SNF where she resides; no reported or noted hx of dysphagia. Recommend Ensure Enlive x 3 daily (1050 kcal, 60 grams protein); will vary flavors per pt request. Also recommend add Leroy x 2 daily to promote wound healing. Continue MVI. Pt declines to offer food preferences at this time, says she eats mostly anything. Continue to monitor PO intake, tolerance to diet.

## 2019-11-17 NOTE — DIETITIAN INITIAL EVALUATION ADULT. - OTHER INFO
Pt currently reports good po intake/appetite at this time, observed with breakfast tray, consumed 4oz. thickened juice and half of Ensure Enlive supplement. Pt denies chewing/swallowing difficulty, denies need for pureed diet or thickened liquids. Resides in long-term nursing facility, records indicate pt was on regular diet with supplemental Ensure PTA. Pt denies N/V/diarrhea or constipation. Denies specific meal preferences at this time. States UBW is 145 pounds, current weight is 120.5 per bedscale, 110 pounds per prior RD note in August 2019 therefore question accuracy of UBW. Pt denies recent weight change however is noted with dementia and is a poor historian. Pt denies food allergies or intolerance.

## 2019-11-17 NOTE — DIETITIAN INITIAL EVALUATION ADULT. - PHYSICAL APPEARANCE
Nutrition-focused physical exam conducted; reveals severe orbital fat pad loss, moderate-severe temporal muscle wasting, protruding clavicle, moderate-severe tricep fat depletion./underweight

## 2019-11-17 NOTE — CHART NOTE - NSCHARTNOTEFT_GEN_A_CORE
Upon Nutritional Assessment by the Registered Dietitian your patient was determined to meet criteria / has evidence of the following diagnosis/diagnoses:          [ ]  Mild Protein Calorie Malnutrition        [ ]  Moderate Protein Calorie Malnutrition        [x] Severe Protein Calorie Malnutrition        [ ] Unspecified Protein Calorie Malnutrition        [ ] Underweight / BMI <19        [ ] Morbid Obesity / BMI > 40      Findings as based on:  [x] Comprehensive nutrition assessment   [x] Nutrition Focused Physical Exam  [ ] Other:       Nutrition Plan/Recommendations:      Please refer to RD initial assessment for recommendations and interventions     PROVIDER Section:     By signing this assessment you are acknowledging and agree with the diagnosis/diagnoses assigned by the Registered Dietitian    Comments:

## 2019-11-18 NOTE — PROGRESS NOTE ADULT - PROBLEM SELECTOR PLAN 1
likely pericarditis  echocardiogram noted  normal LV function  no effusion  continue colchicine 0.6 mg po qd

## 2019-11-18 NOTE — PROGRESS NOTE ADULT - SUBJECTIVE AND OBJECTIVE BOX
Patient is a 89y old  Female who presents with a chief complaint of s/p fall (17 Nov 2019 13:20)      SUBJECTIVE / OVERNIGHT EVENTS: overnight events noted    ROS:  Resp: No cough no sputum production  CVS: No chest pain no palpitations no orthopnea  GI: no N/V/D  : no dysuria, no hematuria  Neuro: no weakness no paresthesias  Heme: No petechiae no easy bruising  Msk: No joint pain no swelling  Skin: No rash no itching        MEDICATIONS  (STANDING):  aspirin enteric coated 81 milliGRAM(s) Oral daily  colchicine 0.6 milliGRAM(s) Oral daily  enoxaparin Injectable 30 milliGRAM(s) SubCutaneous daily  multivitamin 1 Tablet(s) Oral daily    MEDICATIONS  (PRN):        CAPILLARY BLOOD GLUCOSE        I&O's Summary    17 Nov 2019 07:01  -  18 Nov 2019 07:00  --------------------------------------------------------  IN: 600 mL / OUT: 550 mL / NET: 50 mL    18 Nov 2019 07:01  -  18 Nov 2019 16:10  --------------------------------------------------------  IN: 480 mL / OUT: 0 mL / NET: 480 mL        Vital Signs Last 24 Hrs  T(C): 36.7 (18 Nov 2019 12:08), Max: 36.7 (17 Nov 2019 20:42)  T(F): 98.1 (18 Nov 2019 12:08), Max: 98.1 (18 Nov 2019 12:08)  HR: 82 (18 Nov 2019 12:08) (80 - 82)  BP: 134/65 (18 Nov 2019 12:08) (134/65 - 137/70)  BP(mean): --  RR: 19 (18 Nov 2019 12:08) (18 - 19)  SpO2: 98% (18 Nov 2019 12:08) (97% - 100%)    PHYSICAL EXAM: vital signs as above  cachectic  healing ecchymoses left face and head  in no apparent distress  HEENT: RICARDO EOMI  Neck: Supple, no JVD, no thyromegaly  Lungs: no wheeze, no crackles  CVS: S1 S2 soft ejection systolic murmur best heard at left sternal border no rub no gallop   Abdomen: no tenderness, no organomegaly, BS present  Neuro: AO x 0 no focal weakness, no sensory abnormalities moving all 4 limbs  Skin: warm, dry extensive ecchymoses over extremities including right knee left forehead, face   Ext: no cyanosis or clubbing, no edema + sarcopenia  Msk: no joint swelling or deformities  Back: no CVA tenderness, no kyphosis/scoliosis    LABS:                        10.1   12.31 )-----------( 290      ( 17 Nov 2019 07:10 )             32.6     11-17    140  |  101  |  27<H>  ----------------------------<  100<H>  4.0   |  24  |  0.69    Ca    9.2      17 Nov 2019 07:10                  All consultant(s) notes reviewed and care discussed with other providers        Contact Number, Dr George [phone]

## 2019-11-19 NOTE — CHART NOTE - NSCHARTNOTEFT_GEN_A_CORE
LORI MILLER    89yo F s/p fall with injury to right lower extremity of unknown date. Staples present to right lower extremity      Interventions taken   -wound well healed, staples removed, skin CDI  -keep are clean and dry  -will endorse to night NP

## 2019-11-19 NOTE — DISCHARGE NOTE PROVIDER - CARE PROVIDER_API CALL
Nehemiah Kumar (DO)  Medicine  [address] [place] [address], [address]  [place] [address]  Phone: [phone]  Fax: [phone]  Established Patient  Follow Up Time: 1 week

## 2019-11-19 NOTE — DISCHARGE NOTE PROVIDER - HOSPITAL COURSE
To be done. 89 yo F PMH of dementia found on floor in SNF where she resides. Found to have leukocytosis and elevated trop, doubt acute cardiac event.  Pt was seen by Cardiology.  EKG revealed ST elevation.  Echo with normal LV function.  Event likely pericarditis.  Pt will be on colchicine for 3 months.  Fall at SNF of unclear etiology.  Rhabdomyolysis continues to resolve.  Will maintain fall precautions.  Pt is scheduled for discharge to [facility].

## 2019-11-19 NOTE — DISCHARGE NOTE PROVIDER - NSDCMRMEDTOKEN_GEN_ALL_CORE_FT
aspirin 81 mg oral tablet: 1 tab(s) orally once a day  Aspirin Enteric Coated 81 mg oral delayed release tablet: 1 tab(s) orally once a day to start 10/08/18 (D/C Lovenox 10/07/18)  cyanocobalamin 1000 mcg oral tablet: 1 tab(s) orally once a day  docusate sodium 100 mg oral capsule: 1 cap(s) orally 3 times a day  enoxaparin: 40 unit(s) subcutaneous once a day  nystatin 100,000 units/g topical powder: 1 application topically 2 times a day  oxyCODONE 5 mg oral tablet: 1 tab(s) orally every 3 hours, As needed, Mild Pain (1 - 3)  senna oral tablet: 2 tab(s) orally once a day (at bedtime)  Vitamin B-100 oral tablet: 1 tab(s) orally once a day Aspirin Enteric Coated 81 mg oral delayed release tablet: 1 tab(s) orally once a day to start 10/08/18 (D/C Lovenox 10/07/18)  cyanocobalamin 1000 mcg oral tablet: 1 tab(s) orally once a day  docusate sodium 100 mg oral capsule: 1 cap(s) orally 3 times a day  oxyCODONE 5 mg oral tablet: 1 tab(s) orally every 3 hours, As needed, Mild Pain (1 - 3)  senna oral tablet: 2 tab(s) orally once a day (at bedtime)  Vitamin B-100 oral tablet: 1 tab(s) orally once a day Aspirin Enteric Coated 81 mg oral delayed release tablet: 1 tab(s) orally once a day to start 10/08/18 (D/C Lovenox 10/07/18)  colchicine 0.6 mg oral tablet: 1 tab(s) orally once a day  cyanocobalamin 1000 mcg oral tablet: 1 tab(s) orally once a day  docusate sodium 100 mg oral capsule: 1 cap(s) orally 3 times a day  senna oral tablet: 2 tab(s) orally once a day (at bedtime)  Vitamin B-100 oral tablet: 1 tab(s) orally once a day cyanocobalamin 1000 mcg oral tablet: 1 tab(s) orally once a day Aspirin Enteric Coated 81 mg oral delayed release tablet: 1 tab(s) orally once a day  B 100 Complex oral tablet: 1 tab(s) orally once a day   colchicine 0.6 mg oral tablet: 1 tab(s) orally once a day  cyanocobalamin 1000 mcg oral tablet: 1 tab(s) orally once a day  docusate sodium 100 mg oral capsule: 1 cap(s) orally 3 times a day  senna oral tablet: 2 tab(s) orally once a day (at bedtime)

## 2019-11-19 NOTE — PROGRESS NOTE ADULT - SUBJECTIVE AND OBJECTIVE BOX
Patient is a 89y old  Female who presents with a chief complaint of s/p fall (19 Nov 2019 12:50)      SUBJECTIVE / OVERNIGHT EVENTS: overnight events noted    ROS:  Resp: No cough no sputum production  CVS: No chest pain no palpitations no orthopnea  GI: no N/V/D  : no dysuria, no hematuria  Neuro: no weakness no paresthesias  Heme: No petechiae no easy bruising  Msk: No joint pain no swelling  Skin: No rash no itching        MEDICATIONS  (STANDING):  aspirin enteric coated 81 milliGRAM(s) Oral daily  colchicine 0.6 milliGRAM(s) Oral daily  enoxaparin Injectable 30 milliGRAM(s) SubCutaneous daily  multivitamin 1 Tablet(s) Oral daily    MEDICATIONS  (PRN):        CAPILLARY BLOOD GLUCOSE        I&O's Summary    18 Nov 2019 07:01  -  19 Nov 2019 07:00  --------------------------------------------------------  IN: 480 mL / OUT: 0 mL / NET: 480 mL    19 Nov 2019 07:01  -  19 Nov 2019 17:46  --------------------------------------------------------  IN: 360 mL / OUT: 0 mL / NET: 360 mL        Vital Signs Last 24 Hrs  T(C): 37 (19 Nov 2019 14:09), Max: 37 (19 Nov 2019 14:09)  T(F): 98.6 (19 Nov 2019 14:09), Max: 98.6 (19 Nov 2019 14:09)  HR: 78 (19 Nov 2019 14:09) (78 - 87)  BP: 147/72 (19 Nov 2019 14:09) (124/57 - 147/72)  BP(mean): --  RR: 19 (19 Nov 2019 14:09) (18 - 19)  SpO2: 94% (19 Nov 2019 14:09) (94% - 99%)    PHYSICAL EXAM: vital signs as above  cachectic  healing ecchymoses left face and head  in no apparent distress  HEENT: RICARDO EOMI  Neck: Supple, no JVD, no thyromegaly  Lungs: no wheeze, no crackles  CVS: S1 S2 soft ejection systolic murmur best heard at left sternal border no rub no gallop   Abdomen: no tenderness, no organomegaly, BS present  Neuro: AO x 0 no focal weakness, no sensory abnormalities moving all 4 limbs  Skin: warm, dry extensive ecchymoses over extremities including right knee left forehead, face   Ext: no cyanosis or clubbing, no edema + sarcopenia  Msk: no joint swelling or deformities  Back: no CVA tenderness, no kyphosis/scoliosis    LABS:                      All consultant(s) notes reviewed and care discussed with other providers        Contact Number, Dr George [phone]

## 2019-11-19 NOTE — DISCHARGE NOTE PROVIDER - NSDCCPCAREPLAN_GEN_ALL_CORE_FT
PRINCIPAL DISCHARGE DIAGNOSIS  Diagnosis: ST elevation  Assessment and Plan of Treatment: ST elevation  Call your doctor if you have unusual chest pain, pressure, or discomfort, shortness of breath, nausea, vomiting, burping, heartburn, tingling upper body parts, sweating, cold, clammy sking, racing heartbeat  Call 911 if you think you are having a heart attack  Take all cardiac medications as prescribed - notify your doctor if you have any side effects  Follow cardiac diet - avoid fatty & fried foods, don't eat too much red meat, eat lots of fruits & vegetables, dairy products should be low fat        SECONDARY DISCHARGE DIAGNOSES  Diagnosis: Fall, initial encounter  Assessment and Plan of Treatment: Causes of fall may be due to illness, change in the medicines you take, unsafe or unfamiliar setting and conditions that affect eyesight, hearing, muscle strength, or balance.                                                            Certain medicines used for sleep, anxiety, or depression can cause falls. Adding new medicines, or changing doses of some medicines, can also affect your risk of falling. Your doctor might switch you to a different medicine.                                        Prevent falls by make your home safer – To avoid falling at home, get rid of things that might make you trip or slip. This might include furniture, electrical cords, clutter, and loose rugs. Keep your home well lit to avoid falls or accidents. Avoid storing things in high places so you don't have to reach or climb. Wear sturdy shoes that fit well – Wearing shoes with high heels or slippery soles, or shoes that are too loose, can lead to falls.                                                                                                              Use a cane, walker, and other safety devices as these devices help you avoid falling, too. These include grab bars or a sturdy seat for the shower, non-slip bath mats, and hand rails or treads for the stairs (to prevent slipping). If you worry that you could fall, there are also alarm buttons that let you call for help if you fall and can't get up.   It is important to tell your doctor about any times you have fallen or almost fallen.  Seek immediate help for pain or injury after a fall.      Diagnosis: Rhabdomyolysis  Assessment and Plan of Treatment: Rhabdomyolysis  Please follow up with your primary care provider 1-2 weeks after discharge  Drink plenty of fluids through out the day.    Diagnosis: Severe dementia  Assessment and Plan of Treatment: Severe dementia  Dementia causes memory problems and make it hard to think clear. The symptoms of dementia often start off very mild and get worse slowly. Symptoms are forgetfulness, confusion, trouble with language, getting lost in familiar places. As dementia gets worse, it can cause anger or aggression, see things that aren't there, decreased ability to eat, bathe, dress, or do other everyday tasks, or cause people to lose bladder and bowel control. Alzheimer disease, there are medicines that might help some. If you have vascular dementia, your doctor will focus on keeping your blood pressure and cholesterol as normal as possible. Sadly, there really aren't good treatments for most types of dementia.  Prevent falls and accidents by securing loose rugs or use non-skid rugs, loose wires or electrical cords. Wear sturdy, comfortable shoes, keep walkways well lit. There are no proven ways to prevent dementia. But encourage physical activity, healthy diet and social interaction to help keep the brain healthy. Keep medications, sharp knives, lighters, matches, power tools, and guns out of reach. Lower the temperature on water heaters. Keep familiar objects and people around. Use reminders, notes, or directions for daily activities or tasks. Keep a simple, consistent routine for waking, meals, bathing, dressing, and bedtime. Display emergency numbers and home address near all telephones.   SEEK MEDICAL CARE IF: New behavioral problems start such as moodiness, aggressiveness, or seeing things that are not there (hallucinations). Any new problem with brain function happens. This includes problems with balance, speech, swallowing difficulty or falling a lot. Small changes or worsening in any aspect of brain function can be a sign that the illness is getting worse or a sign of infection. Seeing a caregiver right away is important.   SEEK IMMEDIATE MEDICAL CARE IF: New or worsened confusion, sleepiness, or inability to sleep develops.      Diagnosis: Atrial fibrillation  Assessment and Plan of Treatment: Atrial fibrillation is the most common heart rhythm problem.  The condition puts you at risk for has stroke and heart attack  You were started on blood thinners to prevent possible clot and stroke complications.   Monitor for any signs of bleeding and avoid injury while on this medication  If any bleeding persistent and symptomatic stop the medication and notify your doctor immediately.  It helps if you control your blood pressure, not drink more than 1-2 alcohol drinks per day, cut down on caffeine, getting treatment for over active thyroid gland, and get regular exercise  Call your doctor if you feel your heart racing or beating unusually, chest tightness or pain, lightheaded, faint, shortness of breath especially with exercise  It is important to take your heart medication as prescribed  You may be on anticoagulation which is very important to take as directed - you may need blood work to monitor drug levels

## 2019-11-20 NOTE — DISCHARGE NOTE NURSING/CASE MANAGEMENT/SOCIAL WORK - PATIENT PORTAL LINK FT
You can access the FollowMyHealth Patient Portal offered by [facility] by registering at the following website: [email or web address]. By joining [employer]’s FollowMyHealth portal, you will also be able to view your health information using other applications (apps) compatible with our system.

## 2019-11-20 NOTE — PROGRESS NOTE ADULT - SUBJECTIVE AND OBJECTIVE BOX
Patient is a 89y old  Female who presents with a chief complaint of s/p fall (19 Nov 2019 17:46)      SUBJECTIVE / OVERNIGHT EVENTS: overnight events noted    ROS:  Resp: No cough no sputum production  CVS: No chest pain no palpitations no orthopnea  GI: no N/V/D  : no dysuria, no hematuria  Neuro: no weakness no paresthesias  Heme: No petechiae no easy bruising  Msk: No joint pain no swelling  Skin: No rash no itching        MEDICATIONS  (STANDING):  aspirin enteric coated 81 milliGRAM(s) Oral daily  colchicine 0.6 milliGRAM(s) Oral daily  enoxaparin Injectable 30 milliGRAM(s) SubCutaneous daily  multivitamin 1 Tablet(s) Oral daily    MEDICATIONS  (PRN):        CAPILLARY BLOOD GLUCOSE        I&O's Summary    19 Nov 2019 07:01  -  20 Nov 2019 07:00  --------------------------------------------------------  IN: 480 mL / OUT: 100 mL / NET: 380 mL    20 Nov 2019 07:01  -  20 Nov 2019 13:32  --------------------------------------------------------  IN: 120 mL / OUT: 100 mL / NET: 20 mL        Vital Signs Last 24 Hrs  T(C): 36.3 (20 Nov 2019 04:37), Max: 37 (19 Nov 2019 14:09)  T(F): 97.4 (20 Nov 2019 04:37), Max: 98.6 (19 Nov 2019 14:09)  HR: 78 (20 Nov 2019 04:37) (76 - 78)  BP: 145/80 (20 Nov 2019 04:37) (145/80 - 150/73)  BP(mean): --  RR: 18 (20 Nov 2019 04:37) (18 - 19)  SpO2: 100% (20 Nov 2019 04:37) (94% - 100%)    PHYSICAL EXAM: vital signs as above  cachectic  faded ecchymoses left face and head  in no apparent distress  HEENT: RICARDO EOMI  Neck: Supple, no JVD, no thyromegaly  Lungs: no wheeze, no crackles  CVS: S1 S2 soft ejection systolic murmur best heard at left sternal border no rub no gallop   Abdomen: no tenderness, no organomegaly, BS present  Neuro: AO x 0 no focal weakness, no sensory abnormalities moving all 4 limbs  Skin: warm, dry extensive ecchymoses over extremities including right knee left forehead, face healing  Ext: no cyanosis or clubbing, no edema + sarcopenia  Msk: no joint swelling or deformities  Back: no CVA tenderness, no kyphosis/scoliosis    LABS:                      All consultant(s) notes reviewed and care discussed with other providers        Contact Number, Dr George [phone]

## 2019-11-20 NOTE — CHART NOTE - NSCHARTNOTEFT_GEN_A_CORE
Nutrition Follow Up Note  Patient seen for: severe malnutrition follow up    Reason for Admission: s/p fall    89 yo F PMH of dementia found on floor in SNF where she resides. Found to have leukocytosis and elevated trop, doubt acute cardiac event    Source: pt, chart    Diet : dysphagia 1 nectar consistency   nectar consistency ensure enlive 3 x daily      Patient reports: all ok with food      PO intake : 25% of meals as per pt. observed 100% intake of ensure enlive at breakfast     Source for PO intake: pt, RD observed at time of breakfast  this morning      Daily Weight in k.7 (11-19), Weight in k.6 (11-18), Weight in k.8 (-17), Weight in k.7 (-17), Weight in k.8 (11-16), Weight in k.9 (11-15)  % Weight Change: 2% loss since previous assess on     Pertinent Medications: MEDICATIONS  (STANDING):  aspirin enteric coated 81 milliGRAM(s) Oral daily  colchicine 0.6 milliGRAM(s) Oral daily  enoxaparin Injectable 30 milliGRAM(s) SubCutaneous daily  multivitamin 1 Tablet(s) Oral daily      Pertinent Labs: : BUN 27, Glucose 100      Skin per nursing documentation: suspected deep tissue injury right heel  Edema: none    Estimated Needs:   [x ] no change since previous assessment  [ ] recalculated:     Previous Nutrition Diagnosis: severe malnutrition  Nutrition Diagnosis is: plan of care is in progress      Recommend  1)continue multivitamin  2)change diet to regular consistency per diet at SNF where she resides; no reported or noted hx of dysphagia.   3)continue Ensure Enlive x 3 daily (1050 kcal, 60 grams protein); will vary flavors per pt request.   4)add Leroy x 2 daily to promote wound healing.       Monitoring and Evaluation:     Continue to monitor Nutritional intake, Tolerance to diet prescription, weights, labs, skin integrity    RD remains available upon request and will follow up per protocol  Lizy Hernandez MA RD, CDN #[phone]

## 2019-11-20 NOTE — PROGRESS NOTE ADULT - ATTENDING COMMENTS
Patient interviewed and examined. I was physically present for the essential portions of the E/M service provided.  Chart reviewed and note edited where appropriate.  Case discussed with fellow.  Agree w/ Assessment and Plan as outlined.        Nabeel Roe MD [facility]  Spectra:  [phone]  Office: [phone]
discharge to [facility]
PT evaluation in progress
discharge back to [facility] when bed available
discussed with Tele NP
discussed with patient's son at the bedside in detail
discussed with patient's son at the bedside in detail yesterday

## 2021-06-11 NOTE — H&P ADULT - NSHPSOCIALHISTORY_GEN_ALL_CORE
Called patient with normal results of mammogram from 06/10/2021.  They where BENIGN patient was informed.  
   lives with son and daughter - has aide  no drugs  no alcohol  no tobacco

## 2023-02-02 NOTE — ED PROVIDER NOTE - ABNORMAL RHYTHM
General Sunscreen Counseling: I recommended a broad spectrum sunscreen with a SPF of 30 or higher. I explained that sunscreens should be applied at least 15 minutes prior to expected sun exposure and replied every 2 hours or sooner if patient becomes wet or sweaty. I also discussed that sun-protective clothing can be used in lieu of sunscreen, particularly clothing with UPF. Detail Level: Generalized atrial fibrillation

## 2023-02-09 NOTE — INPATIENT CERTIFICATION FOR MEDICARE PATIENTS - CURRENT MEDICAL NEEDS AND CARE PLANS
Detail Level: Detailed
Add 04038 Cpt? (Important Note: In 2017 The Use Of 41928 Is Being Tracked By Cms To Determine Future Global Period Reimbursement For Global Periods): yes
Possible Acute Rehab

## 2023-10-24 NOTE — PATIENT PROFILE ADULT. - NS TRANSFER RESPONSE BELONGING
yes Consent (Marginal Mandibular)/Introductory Paragraph: The rationale for Mohs was explained to the patient and consent was obtained. The risks, benefits and alternatives to therapy were discussed in detail. Specifically, the risks of damage to the marginal mandibular branch of the facial nerve, infection, scarring, bleeding, prolonged wound healing, incomplete removal, allergy to anesthesia, and recurrence were addressed. Prior to the procedure, the treatment site was clearly identified and confirmed by the patient. All components of Universal Protocol/PAUSE Rule completed.

## 2023-12-04 NOTE — PHYSICAL THERAPY INITIAL EVALUATION ADULT - LIVES WITH, PROFILE
The patient had an appointment scheduled for 12/07/23 at 9:30 am with Judith Nelson. The appointment was canceled via Patient Portal on 12/04/23 at 10:19 am.    Reason for Cancellation: Symptoms got better     Patient Comments: Doing well at this time, but I would like to follow up maybe in a month or so at the next scheduled appt      More than 24-hour notice was given for cancellation.    Next Appointment: 12/28/23    After review of encounter, encounter may be closed.   family

## 2024-04-09 NOTE — PATIENT PROFILE ADULT - TRANSPORTATION
Care Transitions Initial Follow Up Call    Outreach made within 2 business days of discharge: yes    Patient: Hema Pritchett Patient : 1942   MRN: [id number]  Reason for Admission: There are no discharge diagnoses documented for the most recent discharge.  Discharge Date: 24       Spoke with: no answer, no voice mail, attempt #1    Discharge department/facility: [facility]        Scheduled appointment with PCP within 7-14 days    Follow Up      Daniela Vale RN    no